# Patient Record
Sex: FEMALE | Race: WHITE | NOT HISPANIC OR LATINO | Employment: FULL TIME
[De-identification: names, ages, dates, MRNs, and addresses within clinical notes are randomized per-mention and may not be internally consistent; named-entity substitution may affect disease eponyms.]

---

## 2017-06-17 ENCOUNTER — HEALTH MAINTENANCE LETTER (OUTPATIENT)
Age: 37
End: 2017-06-17

## 2018-10-16 ENCOUNTER — OFFICE VISIT (OUTPATIENT)
Dept: FAMILY MEDICINE | Facility: CLINIC | Age: 38
End: 2018-10-16
Payer: COMMERCIAL

## 2018-10-16 DIAGNOSIS — Z23 NEED FOR PROPHYLACTIC VACCINATION AND INOCULATION AGAINST INFLUENZA: Primary | ICD-10-CM

## 2018-10-16 PROCEDURE — 90686 IIV4 VACC NO PRSV 0.5 ML IM: CPT

## 2018-10-16 PROCEDURE — 99207 ZZC NO CHARGE NURSE ONLY: CPT

## 2018-10-16 PROCEDURE — 90471 IMMUNIZATION ADMIN: CPT

## 2018-10-16 NOTE — PROGRESS NOTES

## 2018-10-16 NOTE — MR AVS SNAPSHOT
"              After Visit Summary   10/16/2018    Mary Ramirez    MRN: [id number]           Patient Information     Date Of Birth          1980        Visit Information        Provider Department      10/16/2018 4:00 PM [facility]        Today's Diagnoses     Need for prophylactic vaccination and inoculation against influenza    -  1       Follow-ups after your visit        Who to contact     Normal or non-critical lab and imaging results will be communicated to you by MyChart, letter or phone within 4 business days after the clinic has received the results. If you do not hear from us within 7 days, please contact the clinic through MyChart or phone. If you have a critical or abnormal lab result, we will notify you by phone as soon as possible.  Submit refill requests through [employer] or call your pharmacy and they will forward the refill request to us. Please allow 3 business days for your refill to be completed.          If you need to speak with a  for additional information , please call: [phone]           Additional Information About Your Visit        [employer] Information     [employer]iam[employer] lets you send messages to your doctor, view your test results, renew your prescriptions, schedule appointments and more. To sign up, go to [email or web address] . Click on \"Log in\" on the left side of the screen, which will take you to the Welcome page. Then click on \"Sign up Now\" on the right side of the page.     You will be asked to enter the access code listed below, as well as some personal information. Please follow the directions to create your username and password.     Your access code is: [email or web address]  Expires: 2019  4:33 PM     Your access code will  in 90 days. If you need help or a new code, please call your [facility] or [phone].        Care EveryWhere ID     This is your Care EveryWhere ID. This could be used by other organizations to " access your [facility] medical records  [id number]         Blood Pressure from Last 3 Encounters:   09/02/09 120/66   10/04/07 110/72   09/13/07 121/84    Weight from Last 3 Encounters:   09/02/09 150 lb (68 kg)   10/04/07 179 lb (81.2 kg)   09/13/07 178 lb (80.7 kg)              We Performed the Following     FLU VACCINE, SPLIT VIRUS, IM (QUADRIVALENT) [42972]- >3 YRS     Vaccine Administration, Initial [13328]        Primary Care Provider Office Phone # Fax #    Marlen Mccormick [phone] [facility] [address]        Equal Access to Services     DOMINICK RODRIGUES : Hadii devon mustafao Venancio, waaxda lukaiden, qaybta kaalmada wagner, jens díaz. So [facility] [phone].    ATENCIÓN: Si habla español, tiene a montesinos disposición servicios gratuitos de asistencia lingüística. [facility] [phone].    We comply with applicable federal civil rights laws and [place] laws. We do not discriminate on the basis of race, color, national origin, age, disability, sex, sexual orientation, or gender identity.            Thank you!     Thank you for choosing [facility]  for your care. Our goal is always to provide you with excellent care. Hearing back from our patients is one way we can continue to improve our services. Please take a few minutes to complete the written survey that you may receive in the mail after your visit with us. Thank you!             Your Updated Medication List - Protect others around you: Learn how to safely use, store and throw away your medicines at www.disposemymeds.org.          This list is accurate as of 10/16/18  4:33 PM.  Always use your most recent med list.                   Brand Name Dispense Instructions for use Diagnosis    ADVIL 200 MG capsule   Generic drug:  ibuprofen      1 CAPSULE EVERY 4 TO 6 HOURS AS NEEDED

## 2019-11-08 ENCOUNTER — IMMUNIZATION (OUTPATIENT)
Dept: FAMILY MEDICINE | Facility: CLINIC | Age: 39
End: 2019-11-08
Payer: COMMERCIAL

## 2019-11-08 PROCEDURE — 90471 IMMUNIZATION ADMIN: CPT

## 2019-11-08 PROCEDURE — 90686 IIV4 VACC NO PRSV 0.5 ML IM: CPT

## 2020-02-20 ENCOUNTER — OFFICE VISIT (OUTPATIENT)
Dept: FAMILY MEDICINE | Facility: CLINIC | Age: 40
End: 2020-02-20
Payer: COMMERCIAL

## 2020-02-20 ENCOUNTER — ANCILLARY PROCEDURE (OUTPATIENT)
Dept: GENERAL RADIOLOGY | Facility: CLINIC | Age: 40
End: 2020-02-20
Attending: PHYSICIAN ASSISTANT
Payer: COMMERCIAL

## 2020-02-20 VITALS
SYSTOLIC BLOOD PRESSURE: 148 MMHG | TEMPERATURE: 97.9 F | RESPIRATION RATE: 16 BRPM | DIASTOLIC BLOOD PRESSURE: 94 MMHG | HEART RATE: 91 BPM | OXYGEN SATURATION: 98 % | WEIGHT: 205 LBS

## 2020-02-20 DIAGNOSIS — J06.9 VIRAL URI: ICD-10-CM

## 2020-02-20 DIAGNOSIS — R05.9 COUGH: Primary | ICD-10-CM

## 2020-02-20 DIAGNOSIS — R05.9 COUGH: ICD-10-CM

## 2020-02-20 PROCEDURE — 99203 OFFICE O/P NEW LOW 30 MIN: CPT | Performed by: PHYSICIAN ASSISTANT

## 2020-02-20 PROCEDURE — 71046 X-RAY EXAM CHEST 2 VIEWS: CPT | Mod: FY

## 2020-02-20 RX ORDER — AZITHROMYCIN 250 MG/1
TABLET, FILM COATED ORAL
COMMUNITY
Start: 2020-02-19 | End: 2020-06-25

## 2020-02-20 RX ORDER — BENZONATATE 100 MG/1
CAPSULE ORAL
COMMUNITY
Start: 2020-02-19 | End: 2020-06-25

## 2020-02-20 RX ORDER — ALBUTEROL SULFATE 90 UG/1
AEROSOL, METERED RESPIRATORY (INHALATION)
COMMUNITY
Start: 2020-02-19 | End: 2022-08-04

## 2020-02-20 ASSESSMENT — ENCOUNTER SYMPTOMS
NERVOUS/ANXIOUS: 0
NAUSEA: 0
FEVER: 0
VOMITING: 0
ABDOMINAL PAIN: 0
LIGHT-HEADEDNESS: 0
COUGH: 1
CHILLS: 0
SORE THROAT: 1
SHORTNESS OF BREATH: 1
DIARRHEA: 0

## 2020-02-20 NOTE — PATIENT INSTRUCTIONS
Your chest x-ray does not show signs of pneumonia.  Your symptoms are likely viral in nature.  There are no antibiotics to treat viruses.  If your symptoms worsen or you start experiencing fevers, then we would recommend starting the antibiotic you were prescribed.  Additionally, return to clinic if symptoms do worsen.  Otherwise it may take 2 to 4 weeks for your symptoms to slowly improve.  You may use Robitussin-DM for cough and congestion.  You may also benefit from a nasal spray, Flonase, which is a nasal steroid to help reduce congestion.  For pain and fever you may use ibuprofen.    Patient Education     Viral Upper Respiratory Illness (Adult)    You have a viral upper respiratory illness (URI), which is another term for the common cold. This illness is contagious during the first few days. It is spread through the air by coughing and sneezing. It may also be spread by direct contact (touching the sick person and then touching your own eyes, nose, or mouth). Frequent handwashing will decrease risk of spread. Most viral illnesses go away within 7 to 10 days with rest and simple home remedies. Sometimes the illness may last for several weeks. Antibiotics will not kill a virus, and they are generally not prescribed for this condition.  Home care    If symptoms are severe, rest at home for the first 2 to 3 days. When you resume activity, don't let yourself get too tired.    Don't smoke. If you need help stopping, talk with your healthcare provider.    Avoid being exposed to cigarette smoke (yours or others ).    You may use acetaminophen or ibuprofen to control pain and fever, unless another medicine was prescribed. If you have chronic liver or kidney disease, have ever had a stomach ulcer or gastrointestinal bleeding, or are taking blood-thinning medicines, talk with your healthcare provider before using these medicines. Aspirin should never be given to anyone under 18 years of age who is ill with a viral  infection or fever. It may cause severe liver or brain damage.    Your appetite may be poor, so a light diet is fine. Stay well hydrated by drinking 6 to 8 glasses of fluids per day (water, soft drinks, juices, tea, or soup). Extra fluids will help loosen secretions in the nose and lungs.    Over-the-counter cold medicines will not shorten the length of time you re sick, but they may be helpful for the following symptoms: cough, sore throat, and nasal and sinus congestion. If you take prescription medicines, ask your healthcare provider or pharmacist which over-the-counter medicines are safe to use. (Note: Don't use decongestants if you have high blood pressure.)  Follow-up care  Follow up with your healthcare provider, or as advised.  When to seek medical advice  Call your healthcare provider right away if any of these occur:    Cough with lots of colored sputum (mucus)    Severe headache; face, neck, or ear pain    Difficulty swallowing due to throat pain    Fever of 100.4 F (38 C) or higher, or as directed by your healthcare provider  Call 911  Call 911 if any of these occur:    Chest pain, shortness of breath, wheezing, or difficulty breathing    Coughing up blood    Very severe pain with swallowing, especially if it goes along with a muffled voice   Date Last Reviewed: 6/1/2018 2000-2019 [employer]. [address] [place] [address], [place] [address]. All rights reserved. This information is not intended as a substitute for professional medical care. Always follow your healthcare professional's instructions.

## 2020-02-20 NOTE — LETTER
February 20, 2020      Mary Ramirez  [address] [place] [id number]        To Whom It May Concern:    Mary Ramirez was seen in our clinic. She may return to work without restrictions 2/21/20.      Sincerely,        Anh Tobias PA-C

## 2020-02-20 NOTE — NURSING NOTE
"Initial BP (!) 148/94 (BP Location: Right arm, Cuff Size: Adult Large)   Pulse 91   Temp 97.9  F (36.6  C) (Tympanic)   Resp 16   Wt 93 kg (205 lb)   SpO2 98%  Estimated body mass index is 22.48 kg/m  as calculated from the following:    Height as of 9/2/09: 1.74 m (5' 8.5\").    Weight as of 9/2/09: 68 kg (150 lb). .    "

## 2020-02-20 NOTE — PROGRESS NOTES
Subjective     Mary Ramirez is a 39 year old female who presents to clinic today for the following health issues:    Patient has had URI symptoms ongoing for 6 days including intermittently productive cough, fatigue, sore throat, wheezing, chest pain with cough, and dyspnea.   She denies fever, nasal congestion, ear pain, hemoptysis, and rash. Symptoms treated with Tessalon and Inhaler. She denies history of asthma. She was prescribed Azithromycin, but has not been taking it as she is not sure if she has pneumonia. Patient notes daughter has similar symptoms.   Patient denies heart problems, edema, and history of blood clots.       HPI   ENT Symptoms             Symptoms: cc Present Absent Comment   Fever/Chills   x    Fatigue  x  Fatigue x4 days   Muscle Aches   x    Eye Irritation   x    Sneezing   x    Nasal Kobi/Drg   x    Sinus Pressure/Pain   x    Loss of smell   x    Dental pain   x    Sore Throat  x  Sore throat from coughing   Swollen Glands   x    Ear Pain/Fullness   x    Cough x   Cough x4 days, patient was [facility] for cough    Wheeze  x     Chest Pain  x     Shortness of breath  x     Rash   x    Other   x      Symptom duration:  4 days   Symptom severity:  moderate-severe   Treatments tried:  inhaler, benzonatate   Contacts:  daughter has similar symptoms          Patient Active Problem List   Diagnosis     Benign neoplasm of skin of trunk, except scrotum     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     CARDIOVASCULAR SCREENING; LDL GOAL LESS THAN 160     Past Surgical History:   Procedure Laterality Date     APPENDECTOMY       CHOLECYSTECTOMY, LAPOROSCOPIC      Cholecystectomy, Laparoscopic     FRACTURE TX, WRIST RT/LT  2003    Left wrist, closed reduction       Social History     Tobacco Use     Smoking status: Former Smoker     Packs/day: 0.10     Years: 10.00     Pack years: 1.00     Types: Cigarettes     Last attempt to quit: 2003     Years since quittin.6      Smokeless tobacco: Never Used   Substance Use Topics     Alcohol use: No     Comment: rare     Family History   Problem Relation Age of Onset     C.A.D. Maternal Grandfather         CABG-in his late 50's     Diabetes Maternal Grandfather      Thyroid Disease Mother         hypo     Breast Cancer Maternal Grandmother      Family History Negative Paternal Grandfather         was killed in MVA     Diabetes Paternal Aunt      C.A.D. Maternal Uncle         MI at age 43     Hypertension No family hx of      Cerebrovascular Disease No family hx of      Cancer - colorectal No family hx of      Alcohol/Drug No family hx of          Current Outpatient Medications   Medication Sig Dispense Refill     benzonatate 100 MG PO capsule        VENTOLIN  (90 Base) MCG/ACT IN inhaler        azithromycin 250 MG PO tablet        Allergies   Allergen Reactions     Codeine Nausea       Reviewed and updated as needed this visit by Provider         Review of Systems   Constitutional: Negative for chills and fever.   HENT: Positive for sore throat. Negative for congestion and ear pain.    Respiratory: Positive for cough and shortness of breath.    Cardiovascular: Negative for chest pain.   Gastrointestinal: Negative for abdominal pain, diarrhea, nausea and vomiting.   Skin: Negative for rash.   Neurological: Negative for light-headedness.   Psychiatric/Behavioral: The patient is not nervous/anxious.             Objective    BP (!) 148/94 (BP Location: Right arm, Cuff Size: Adult Large)   Pulse 91   Temp 97.9  F (36.6  C) (Tympanic)   Resp 16   Wt 93 kg (205 lb)   SpO2 98%   There is no height or weight on file to calculate BMI.  Physical Exam  Vitals signs and nursing note reviewed.   Constitutional:       General: She is not in acute distress.     Appearance: Normal appearance. She is not ill-appearing or toxic-appearing.   HENT:      Head: Normocephalic and atraumatic.      Right Ear: Tympanic membrane and ear canal normal.       Left Ear: Tympanic membrane and ear canal normal.      Nose: Congestion and rhinorrhea present.      Mouth/Throat:      Mouth: Mucous membranes are moist.      Pharynx: Oropharynx is clear. Posterior oropharyngeal erythema present. No oropharyngeal exudate.      Comments: No swelling   Eyes:      Extraocular Movements: Extraocular movements intact.      Pupils: Pupils are equal, round, and reactive to light.   Neck:      Musculoskeletal: Normal range of motion.   Cardiovascular:      Rate and Rhythm: Normal rate and regular rhythm.      Heart sounds: Normal heart sounds.   Pulmonary:      Effort: Pulmonary effort is normal. No respiratory distress.      Breath sounds: Normal breath sounds. No wheezing, rhonchi or rales.   Musculoskeletal: Normal range of motion.   Skin:     General: Skin is warm and dry.   Neurological:      General: No focal deficit present.      Mental Status: She is alert.   Psychiatric:         Mood and Affect: Mood normal.         Behavior: Behavior normal.            Diagnostic Test Results:  Labs reviewed in [facility]  CXR -Per my interpretation, no signs of consolidation.        Assessment & Plan     1. Cough  Chest x-ray without consolidations to suggest pneumonia.  Low suspicion for influenza given normal vital signs.  Low suspicion for acute pulmonary process as lungs are clear to auscultation and vital signs normal.  Symptoms likely due to viral URI.   - XR Chest 2 Views; Future    2. Viral URI  Discussed symptomatic treatment and OTC options.  Discussed return precautions.         See Patient Instructions    Return in about 2 weeks (around 3/5/2020), or if symptoms worsen or fail to improve.    Anh Tobias PA-C  [facility]

## 2020-06-25 ENCOUNTER — NURSE TRIAGE (OUTPATIENT)
Dept: NURSING | Facility: CLINIC | Age: 40
End: 2020-06-25

## 2020-06-25 ENCOUNTER — VIRTUAL VISIT (OUTPATIENT)
Dept: FAMILY MEDICINE | Facility: CLINIC | Age: 40
End: 2020-06-25
Payer: COMMERCIAL

## 2020-06-25 DIAGNOSIS — Z20.822 EXPOSURE TO COVID-19 VIRUS: Primary | ICD-10-CM

## 2020-06-25 DIAGNOSIS — F41.9 ANXIETY: ICD-10-CM

## 2020-06-25 PROCEDURE — 99214 OFFICE O/P EST MOD 30 MIN: CPT | Mod: 95 | Performed by: FAMILY MEDICINE

## 2020-06-25 RX ORDER — NORGESTIMATE AND ETHINYL ESTRADIOL 0.25-0.035
1 KIT ORAL DAILY
COMMUNITY
End: 2022-08-04

## 2020-06-25 RX ORDER — ESCITALOPRAM OXALATE 10 MG/1
10 TABLET ORAL DAILY
Qty: 30 TABLET | Refills: 3 | Status: SHIPPED | OUTPATIENT
Start: 2020-06-25 | End: 2020-10-19

## 2020-06-25 ASSESSMENT — PATIENT HEALTH QUESTIONNAIRE - PHQ9
5. POOR APPETITE OR OVEREATING: NEARLY EVERY DAY
SUM OF ALL RESPONSES TO PHQ QUESTIONS 1-9: 12

## 2020-06-25 ASSESSMENT — ANXIETY QUESTIONNAIRES
7. FEELING AFRAID AS IF SOMETHING AWFUL MIGHT HAPPEN: NEARLY EVERY DAY
IF YOU CHECKED OFF ANY PROBLEMS ON THIS QUESTIONNAIRE, HOW DIFFICULT HAVE THESE PROBLEMS MADE IT FOR YOU TO DO YOUR WORK, TAKE CARE OF THINGS AT HOME, OR GET ALONG WITH OTHER PEOPLE: EXTREMELY DIFFICULT
3. WORRYING TOO MUCH ABOUT DIFFERENT THINGS: NEARLY EVERY DAY
GAD7 TOTAL SCORE: 20
5. BEING SO RESTLESS THAT IT IS HARD TO SIT STILL: NEARLY EVERY DAY
2. NOT BEING ABLE TO STOP OR CONTROL WORRYING: NEARLY EVERY DAY
6. BECOMING EASILY ANNOYED OR IRRITABLE: MORE THAN HALF THE DAYS
1. FEELING NERVOUS, ANXIOUS, OR ON EDGE: NEARLY EVERY DAY

## 2020-06-25 NOTE — TELEPHONE ENCOUNTER
"Patient and daughter were very ill in early February for a week. Patient states she thought she had pneumonia but did not at the time. They weren't sure what she had.     Patient is calling requesting COVID serologic antibody testing.  NOTE: Serologic testing is a blood test for 'antibodies' which are made at 10-14 days after you have had symptoms of COVID or were exposed and had an asymptomatic infection.  This does NOT test you for 'active' infection or tell you if you are contagious.    Are you a healthcare worker?  No  Do you currently have a cough, fever, body aches, shortness of breath, or difficulty breathing?  Yes.  Follow applicable RN triage protocol.     Transferred patient to scheduling. Call was disconnected and scheduling will call patient directly.     ERIC Box RN      Reason for Disposition    MILD difficulty breathing (e.g., minimal/no SOB at rest, SOB with walking, pulse <100)    Answer Assessment - Initial Assessment Questions  1. COVID-19 DIAGNOSIS: \"Who made your Coronavirus (COVID-19) diagnosis?\" \"Was it confirmed by a positive lab test?\" If not diagnosed by a HCP, ask \"Are there lots of cases (community spread) where you live?\" (See public health department website, if unsure)        2. ONSET: \"When did the COVID-19 symptoms start?\"       Early February  3. WORST SYMPTOM: \"What is your worst symptom?\" (e.g., cough, fever, shortness of breath, muscle aches)      cough  4. COUGH: \"Do you have a cough?\" If so, ask: \"How bad is the cough?\"        Dry mostly, productive in am's  5. FEVER: \"Do you have a fever?\" If so, ask: \"What is your temperature, how was it measured, and when did it start?\"      no  6. RESPIRATORY STATUS: \"Describe your breathing?\" (e.g., shortness of breath, wheezing, unable to speak)       Shortness of breath at rest at times occasionally, sometimes with stairs  7. BETTER-SAME-WORSE: \"Are you getting better, staying the same or getting worse compared to yesterday?\"  If " "getting worse, ask, \"In what way?\"      same  8. HIGH RISK DISEASE: \"Do you have any chronic medical problems?\" (e.g., asthma, heart or lung disease, weak immune system, etc.)      no  9. PREGNANCY: \"Is there any chance you are pregnant?\" \"When was your last menstrual period?\"      no  10. OTHER SYMPTOMS: \"Do you have any other symptoms?\"  (e.g., chills, fatigue, headache, loss of smell or taste, muscle pain, sore throat)        Headaches daily behind right eye, sore throat with coughing, fatigue    Answer Assessment - Initial Assessment Questions  1. CONCERN: \"What happened that made you call today?\"      Cough for 2 months  2. DEPRESSION SYMPTOM SCREENING: \"How are you feeling overall?\" (e.g., decreased energy, increased sleeping or difficulty sleeping, difficulty concentrating, feelings of sadness, guilt, hopelessness, or worthlessness)      Decreased energy, sadness  3. RISK OF HARM - SUICIDAL IDEATION:  \"Do you ever have thoughts of hurting or killing yourself?\"  (e.g., yes, no, no but preoccupation with thoughts about death)    - INTENT:  \"Do you have thoughts of hurting or killing yourself right NOW?\" (e.g., yes, no, N/A)    - PLAN: \"Do you have a specific plan for how you would do this?\" (e.g., gun, knife, overdose, no plan, N/A)      no  4. RISK OF HARM - HOMICIDAL IDEATION:  \"Do you ever have thoughts of hurting or killing someone else?\"  (e.g., yes, no, no but preoccupation with thoughts about death)    - INTENT:  \"Do you have thoughts of hurting or killing someone right NOW?\" (e.g., yes, no, N/A)    - PLAN: \"Do you have a specific plan for how you would do this?\" (e.g., gun, knife, no plan, N/A)       no  5. FUNCTIONAL IMPAIRMENT: \"How have things been going for you overall in your life? Have you had any more difficulties than usual doing your normal daily activities?\"  (e.g., better, same, worse; self-care, school, work, interactions)      no  6. SUPPORT: \"Who is with you now?\" \"Who do you live with?\" " "\"Do you have family or friends nearby who you can talk to?\"       Family and daughter   7. THERAPIST: \"Do you have a counselor or therapist? Name?\"      yes  8. STRESSORS: \"Has there been any new stress or recent changes in your life?\"      Illness   9. DRUG ABUSE/ALCOHOL: \"Do you drink alcohol or use any illegal drugs?\"       Occasionally of both marijuana and alcohol  10. OTHER: \"Do you have any other health or medical symptoms right now?\" (e.g., fever)        cough  11. PREGNANCY: \"Is there any chance you are pregnant?\" \"When was your last menstrual period?\"        no    Protocols used: CORONAVIRUS (COVID-19) DIAGNOSED OR [id number] 5.16.20, DEPRESSION-A-OH    COVID 19 Nurse Triage Plan/Patient Instructions    Please be aware that novel coronavirus (COVID-19) may be circulating in the community. If you develop symptoms such as fever, cough, or SOB or if you have concerns about the presence of another infection including coronavirus (COVID-19), please contact your health care provider or visit [email or web address].     Disposition/Instructions    Patient to schedule a Virtual Visit with provider. Reference Visit Selection Guide.    Thank you for taking steps to prevent the spread of this virus.  o Limit your contact with others.  o Wear a simple mask to cover your cough.  o Wash your hands well and often.    Resources    [facility]: About COVID-19: [email or web address]    CDC: What to Do If You're Sick: www.cdc.gov/coronavirus/2019-ncov/about/steps-when-sick.html    CDC: Ending Home Isolation: www.cdc.gov/coronavirus/2019-ncov/hcp/disposition-in-home-patients.html     CDC: Caring for Someone: www.cdc.gov/coronavirus/2019-ncov/if-you-are-sick/care-for-someone.html     [facility]: Interim Guidance for Hospital Discharge to Home: www.health.[facility].[place]/diseases/coronavirus/hcp/hospdischarge.pdf    [facility] clinical trials (COVID-19 research studies): " clinicalaffairs.[facility]-clinical-trials     Below are the COVID-19 hotlines at the [place] Department of Health ([facility]). Interpreters are available.   o For health questions: Call 530-262-0238 or 1-316.338.2617 (7 a.m. to 7 p.m.)  o For questions about schools and childcare: Call 550-889-9257 or 1-676.397.3622 (7 a.m. to 7 p.m.)

## 2020-06-25 NOTE — PROGRESS NOTES
"Mary Ramirez is a 39 year old female who is being evaluated via a billable telephone visit.        Patient is a 39 yr old female here for anxiety . She has had this for a long period of time. She reports that she used to be on lorazepam but does not want to take this because of fear of dependence. She also reports being on antidepressants in the past . She was tearful on the phone. I asked if she had family around which she confirmed.       The patient has been notified of following:     \"This telephone visit will be conducted via a call between you and your physician/provider. We have found that certain health care needs can be provided without the need for a physical exam.  This service lets us provide the care you need with a short phone conversation.  If a prescription is necessary we can send it directly to your pharmacy.  If lab work is needed we can place an order for that and you can then stop by our lab to have the test done at a later time.    Telephone visits are billed at different rates depending on your insurance coverage. During this emergency period, for some insurers they may be billed the same as an in-person visit.  Please reach out to your insurance provider with any questions.    If during the course of the call the physician/provider feels a telephone visit is not appropriate, you will not be charged for this service.\"    Patient has given verbal consent for Telephone visit?  Yes    What phone number would you like to be contacted at? [phone]    How would you like to obtain your AVS? Mail a copy    Subjective     Mary Ramirez is a 39 year old female who presents via phone visit today for the following health issues:    HPI   Chief Complaint   Patient presents with     Patient would like the blood test for covid 19     Anxiety     Patient had all the sx of COVID 19  in the the winter and would like the serology test for it to see if she did arcually have it   Abnormal Mood " Symptoms  Onset: patient ha shad for a while     Description: patient is struggling with anxiety with the increase in stress   Depression: YES but patient thinks it is ADHD  Anxiety: YES    Accompanying Signs & Symptoms:  Still participating in activities that you used to enjoy: YES  Fatigue: YES  Irritability: no  Difficulty concentrating: YES  Changes in appetite: YES  Problems with sleep: YES- from stress  Heart racing/beating fast : no   Thoughts of hurting yourself or others: none    History:   Recent stress: YES- work  Prior depression hospitalization: None  Family history of depression: no   Family history of anxiety: no     Precipitating factors:   Alcohol/drug use: YES- beer and marijuana    Alleviating factors:Patient likes to Dance     Therapies Tried and outcome: Ativan (Lorezepam)          Patient Active Problem List   Diagnosis     Benign neoplasm of skin of trunk, except scrotum     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     CARDIOVASCULAR SCREENING; LDL GOAL LESS THAN 160     Past Surgical History:   Procedure Laterality Date     APPENDECTOMY       CHOLECYSTECTOMY, LAPOROSCOPIC      Cholecystectomy, Laparoscopic     FRACTURE TX, WRIST RT/LT  2003    Left wrist, closed reduction       Social History     Tobacco Use     Smoking status: Former Smoker     Packs/day: 0.10     Years: 10.00     Pack years: 1.00     Types: Cigarettes     Last attempt to quit: 2003     Years since quittin.0     Smokeless tobacco: Never Used   Substance Use Topics     Alcohol use: No     Comment: rare     Family History   Problem Relation Age of Onset     C.A.D. Maternal Grandfather         CABG-in his late 50's     Diabetes Maternal Grandfather      Thyroid Disease Mother         hypo     Breast Cancer Maternal Grandmother      Family History Negative Paternal Grandfather         was killed in MVA     Diabetes Paternal Aunt      C.A.D. Maternal Uncle         MI at age 43     Hypertension No family hx of       Cerebrovascular Disease No family hx of      Cancer - colorectal No family hx of      Alcohol/Drug No family hx of          Current Outpatient Medications   Medication Sig Dispense Refill     escitalopram (LEXAPRO) 10 MG tablet Take 1 tablet (10 mg) by mouth daily 30 tablet 3     norgestimate-ethinyl estradiol (ORTHO-CYCLEN, 28,) 0.25-35 MG-MCG tablet Take 1 tablet by mouth daily       VENTOLIN  (90 Base) MCG/ACT IN inhaler        Allergies   Allergen Reactions     Codeine Nausea     BP Readings from Last 3 Encounters:   02/20/20 (!) 148/94   09/02/09 120/66   10/04/07 110/72    Wt Readings from Last 3 Encounters:   02/20/20 93 kg (205 lb)   09/02/09 68 kg (150 lb)   10/04/07 81.2 kg (179 lb)                    Reviewed and updated as needed this visit by Provider  Tobacco  Allergies  Meds  Problems  Med Hx  Surg Hx  Fam Hx         Review of Systems   Constitutional, HEENT, cardiovascular, pulmonary, gi and gu systems are negative, except as otherwise noted.       Objective   Reported vitals:  There were no vitals taken for this visit.   healthy, alert and no distress  PSYCH: Alert and oriented times 3; coherent speech, normal   rate and volume, able to articulate logical thoughts, able   to abstract reason, no tangential thoughts, no hallucinations   or delusions  Her affect is anxious and tearful  RESP: No cough, no audible wheezing, able to talk in full sentences  Remainder of exam unable to be completed due to telephone visits    Diagnostic Test Results:  none         Assessment/Plan:  1. Exposure to COVID-19 virus  Patient will be notified of results  - COVID-19 Virus (Coronavirus) Antibody & Titer Reflex; Future    2. Anxiety  Medication faxed, referral placed for counseling.  - escitalopram (LEXAPRO) 10 MG tablet; Take 1 tablet (10 mg) by mouth daily  Dispense: 30 tablet; Refill: 3  - MENTAL HEALTH REFERRAL  - Adult; Outpatient Treatment; Individual/Couples/Family/Group Therapy/Health  Psychology; [facility] [place] [facility] [phone]; We will contact you to schedule the appointment or please call with any questions    Return in about 3 weeks (around 7/16/2020) for Phone Visit.      Phone call duration:  10 minutes    Desirae Lazaro MD

## 2020-06-26 ASSESSMENT — ANXIETY QUESTIONNAIRES: GAD7 TOTAL SCORE: 20

## 2020-10-04 ENCOUNTER — NURSE TRIAGE (OUTPATIENT)
Dept: NURSING | Facility: CLINIC | Age: 40
End: 2020-10-04

## 2020-10-04 ENCOUNTER — HOSPITAL ENCOUNTER (EMERGENCY)
Facility: CLINIC | Age: 40
Discharge: HOME | End: 2020-10-05
Attending: EMERGENCY MEDICINE | Admitting: EMERGENCY MEDICINE
Payer: COMMERCIAL

## 2020-10-04 ENCOUNTER — APPOINTMENT (OUTPATIENT)
Dept: GENERAL RADIOLOGY | Facility: CLINIC | Age: 40
End: 2020-10-04
Attending: EMERGENCY MEDICINE
Payer: COMMERCIAL

## 2020-10-04 VITALS
RESPIRATION RATE: 18 BRPM | HEART RATE: 110 BPM | SYSTOLIC BLOOD PRESSURE: 134 MMHG | OXYGEN SATURATION: 95 % | WEIGHT: 197 LBS | DIASTOLIC BLOOD PRESSURE: 83 MMHG | TEMPERATURE: 98.6 F | BODY MASS INDEX: 29.18 KG/M2 | HEIGHT: 69 IN
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
## 2020-10-04 DIAGNOSIS — S49.91XA SHOULDER INJURY, RIGHT, INITIAL ENCOUNTER: ICD-10-CM

## 2020-10-04 PROCEDURE — 73030 X-RAY EXAM OF SHOULDER: CPT | Mod: RT

## 2020-10-04 PROCEDURE — 99283 EMERGENCY DEPT VISIT LOW MDM: CPT | Performed by: EMERGENCY MEDICINE

## 2020-10-04 PROCEDURE — 99284 EMERGENCY DEPT VISIT MOD MDM: CPT | Performed by: EMERGENCY MEDICINE

## 2020-10-04 RX ORDER — IBUPROFEN 400 MG/1
800 TABLET, FILM COATED ORAL ONCE
Status: DISCONTINUED | OUTPATIENT
Start: 2020-10-04 | End: 2020-10-04 | Stop reason: CLARIF

## 2020-10-04 RX ORDER — ACETAMINOPHEN 325 MG/1
650 TABLET ORAL
Status: COMPLETED | OUTPATIENT
Start: 2020-10-04 | End: 2020-10-05

## 2020-10-04 ASSESSMENT — ENCOUNTER SYMPTOMS
RESPIRATORY NEGATIVE: 1
CARDIOVASCULAR NEGATIVE: 1
PSYCHIATRIC NEGATIVE: 1
NEUROLOGICAL NEGATIVE: 1
EYES NEGATIVE: 1
CONSTITUTIONAL NEGATIVE: 1
GASTROINTESTINAL NEGATIVE: 1
HEMATOLOGIC/LYMPHATIC NEGATIVE: 1
ALLERGIC/IMMUNOLOGIC NEGATIVE: 1
ENDOCRINE NEGATIVE: 1

## 2020-10-04 ASSESSMENT — MIFFLIN-ST. JEOR
SCORE: 1637.97
SCORE: 1637.97

## 2020-10-04 NOTE — ED AVS SNAPSHOT
[facility] Emergency Dept  [address] [address]  Phone: [phone]  Fax: [phone]                                    Mary Ramirez   MRN: [id number]    Department: [facility] Emergency Dept   Date of Visit: 10/4/2020           After Visit Summary Signature Page    I have received my discharge instructions, and my questions have been answered. I have discussed any challenges I see with this plan with the nurse or doctor.    ..........................................................................................................................................  Patient/Patient Representative Signature      ..........................................................................................................................................  Patient Representative Print Name and Relationship to Patient    ..................................................               ................................................  Date                                   Time    ..........................................................................................................................................  Reviewed by Signature/Title    ...................................................              ..............................................  Date                                               Time          22EPIC Rev 08/18

## 2020-10-04 NOTE — LETTER
October 5, 2020      To Whom It May Concern:      Mary Ramirez was seen in our Emergency Department today, 10/05/20.  I expect her condition to improve over the next 1 to 2 weeks.  She is excused from work until she is cleared to return to work at full capacity and duty after further consultation and care.  If she decides to return to work she may work light duty.  She is to remain in her sling for comfort.  This work excuse is valid until October 21, 2020        Sincerely,          Tomás Engel MD

## 2020-10-05 ENCOUNTER — OFFICE VISIT (OUTPATIENT)
Dept: ORTHOPEDICS | Facility: CLINIC | Age: 40
End: 2020-10-05
Payer: COMMERCIAL

## 2020-10-05 VITALS
DIASTOLIC BLOOD PRESSURE: 84 MMHG | BODY MASS INDEX: 29.18 KG/M2 | HEIGHT: 69 IN | WEIGHT: 197 LBS | SYSTOLIC BLOOD PRESSURE: 124 MMHG

## 2020-10-05 DIAGNOSIS — S49.91XA SHOULDER INJURY, RIGHT, INITIAL ENCOUNTER: ICD-10-CM

## 2020-10-05 PROCEDURE — 250N000013 HC RX MED GY IP 250 OP 250 PS 637: Performed by: EMERGENCY MEDICINE

## 2020-10-05 PROCEDURE — 99204 OFFICE O/P NEW MOD 45 MIN: CPT | Performed by: PEDIATRICS

## 2020-10-05 RX ORDER — OXYCODONE AND ACETAMINOPHEN 5; 325 MG/1; MG/1
1 TABLET ORAL
Status: COMPLETED | OUTPATIENT
Start: 2020-10-05 | End: 2020-10-05

## 2020-10-05 RX ORDER — OXYCODONE AND ACETAMINOPHEN 5; 325 MG/1; MG/1
1 TABLET ORAL
Qty: 7 TABLET | Refills: 0 | Status: SHIPPED | OUTPATIENT
Start: 2020-10-05 | End: 2022-08-04

## 2020-10-05 RX ADMIN — ACETAMINOPHEN 650 MG: 325 TABLET, FILM COATED ORAL at 01:04

## 2020-10-05 RX ADMIN — OXYCODONE HYDROCHLORIDE AND ACETAMINOPHEN 1 TABLET: 5; 325 TABLET ORAL at 01:04

## 2020-10-05 ASSESSMENT — MIFFLIN-ST. JEOR: SCORE: 1637.97

## 2020-10-05 NOTE — TELEPHONE ENCOUNTER
"\"I think I dislocated my right shoulder\". She fell, tripping on her cat !+1/2 hrs ago.\" I can't move my arm.\" Painful=\"3\" @ rest, unable to move her arm and it hurts more if she tries. She took 2 Ibuprofen for the pain.    Triaged to a disposition of Go to the ED now. She intends to go to the nearest ER=[facility].     Reason for Disposition    Looks like a dislocated joint    Can't move injured shoulder at all    Additional Information    Shoulder injury is main concern    Negative: Serious injury with multiple fractures    Negative: [1] Major bleeding (e.g., actively dripping or spurting) AND [2] can't be stopped    Negative: Bullet wound, stabbed by knife, or other serious penetrating wound    Negative: Sounds like a life-threatening emergency to the triager    Negative: Wound looks infected    Negative: Shoulder pain from overuse (work, exercise, gardening) OR from self-induced lifting injury    Negative: Shoulder pain not from an injury    Negative: Looks like a broken bone (crooked or deformed)    Protocols used: SHOULDER INJURY-A-AH, ARM INJURY-A-AH  COVID 19 Nurse Triage Plan/Patient Instructions    Please be aware that novel coronavirus (COVID-19) may be circulating in the community. If you develop symptoms such as fever, cough, or SOB or if you have concerns about the presence of another infection including coronavirus (COVID-19), please contact your health care provider or visit [email or web address].     Disposition/Instructions    ED Visit recommended. Follow protocol based instructions.     Bring Your Own Device:  Please also bring your smart device(s) (smart phones, tablets, laptops) and their charging cables for your personal use and to communicate with your care team during your visit.    Thank you for taking steps to prevent the spread of this virus.  o Limit your contact with others.  o Wear a simple mask to cover your cough.  o Wash your hands well and often.    Resources    [facility]: About " COVID-19: [email or web address]    CDC: What to Do If You're Sick: www.cdc.gov/coronavirus/2019-ncov/about/steps-when-sick.html    CDC: Ending Home Isolation: www.cdc.gov/coronavirus/2019-ncov/hcp/disposition-in-home-patients.html     CDC: Caring for Someone: www.cdc.gov/coronavirus/2019-ncov/if-you-are-sick/care-for-someone.html     [facility]: Interim Guidance for Hospital Discharge to Home: www.health.[facility].mn.us/diseases/coronavirus/hcp/hospdischarge.pdf    [facility] clinical trials (COVID-19 research studies): clinicalaffairs.[facility]-clinical-trials     Below are the COVID-19 hotlines at the [place] Department of Health ([facility]). Interpreters are available.   o For health questions: Call 017-747-2754 or 1-476.588.7254 (7 a.m. to 7 p.m.)  o For questions about schools and childcare: Call 195-123-5411 or 1-261.959.5814 (7 a.m. to 7 p.m.)

## 2020-10-05 NOTE — ED PROVIDER NOTES
History     Chief Complaint   Patient presents with     Shoulder Pain     fell on stairs hurting scapula rt side     HPI  Mary Ramirez is a 39 year old female who presents for evaluation for right shoulder pain and discomfort after a fall.  On arrival patient reports she fell down 5 steps at home walking down steps and that her cat typically likes to walk between her feet when she walks down the steps.  She reports she lost her footing falling onto her right shoulder.  She did not hit her head.  The fall occurred about 2 hours before she presented by car from home for further evaluation and care.  She reports no neck pain.  She reports limited range of motion about the shoulder with increasing pain although she took some ibuprofen and had minimal relief and presents for further evaluation and care.    Allergies:  Allergies   Allergen Reactions     Codeine Nausea       Problem List:    Patient Active Problem List    Diagnosis Date Noted     CARDIOVASCULAR SCREENING; LDL GOAL LESS THAN 160 10/31/2010     Priority: Medium     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 01/15/2007     Priority: Medium     Problem list name updated by automated process. Provider to review       Benign neoplasm of skin of trunk, except scrotum 03/25/2005     Priority: Medium        Past Medical History:    Past Medical History:   Diagnosis Date     Depressive disorder, not elsewhere classified      Irritable bowel syndrome      Pyelonephritis, unspecified age 14       Past Surgical History:    Past Surgical History:   Procedure Laterality Date     APPENDECTOMY       CHOLECYSTECTOMY, LAPOROSCOPIC      Cholecystectomy, Laparoscopic     FRACTURE TX, WRIST RT/LT  11/2003    Left wrist, closed reduction       Family History:    Family History   Problem Relation Age of Onset     C.A.D. Maternal Grandfather         CABG-in his late 50's     Diabetes Maternal Grandfather      Thyroid Disease Mother         hypo     Breast Cancer Maternal  "Grandmother      Family History Negative Paternal Grandfather         was killed in MVA     Diabetes Paternal Aunt      JOSIAS. Maternal Uncle         MI at age 43     Hypertension No family hx of      Cerebrovascular Disease No family hx of      Cancer - colorectal No family hx of      Alcohol/Drug No family hx of        Social History:  Marital Status:  Single [1]  Social History     Tobacco Use     Smoking status: Former Smoker     Packs/day: 0.10     Years: 10.00     Pack years: 1.00     Types: Cigarettes     Quit date: 2003     Years since quittin.2     Smokeless tobacco: Never Used   Substance Use Topics     Alcohol use: No     Comment: rare     Drug use: No        Medications:         oxyCODONE-acetaminophen (PERCOCET) 5-325 MG tablet       escitalopram (LEXAPRO) 10 MG tablet       norgestimate-ethinyl estradiol (ORTHO-CYCLEN, 28,) 0.25-35 MG-MCG tablet       VENTOLIN  (90 Base) MCG/ACT IN inhaler          Review of Systems   Constitutional: Negative.         Fall down 5 steps   HENT: Negative.    Eyes: Negative.    Respiratory: Negative.    Cardiovascular: Negative.    Gastrointestinal: Negative.    Endocrine: Negative.    Genitourinary: Negative.    Musculoskeletal:        Right shoulder pain   Skin: Negative.    Allergic/Immunologic: Negative.    Neurological: Negative.    Hematological: Negative.    Psychiatric/Behavioral: Negative.    All other systems reviewed and are negative.      Physical Exam   BP: (!) 164/101  Pulse: 115  Temp: 98.6  F (37  C)  Resp: 18  Height: 175.3 cm (5' 9\")  Weight: 89.4 kg (197 lb)  SpO2: 97 %      Physical Exam  Constitutional:       General: She is not in acute distress.     Appearance: Normal appearance. She is not ill-appearing, toxic-appearing or diaphoretic.   HENT:      Head: Normocephalic and atraumatic.      Nose: Nose normal. No congestion.   Eyes:      General: No scleral icterus.        Right eye: No discharge.         Left eye: No discharge.      " Extraocular Movements: Extraocular movements intact.      Pupils: Pupils are equal, round, and reactive to light.   Neck:      Musculoskeletal: Normal range of motion and neck supple. No neck rigidity or muscular tenderness.      Vascular: No carotid bruit.   Cardiovascular:      Rate and Rhythm: Normal rate and regular rhythm.      Pulses: Normal pulses.      Heart sounds: Normal heart sounds.   Pulmonary:      Effort: Pulmonary effort is normal. No respiratory distress.      Breath sounds: Normal breath sounds. No stridor. No wheezing, rhonchi or rales.   Chest:      Chest wall: No tenderness.   Musculoskeletal:         General: Tenderness and signs of injury present. No swelling or deformity.      Right shoulder: She exhibits decreased range of motion, tenderness, bony tenderness and pain. She exhibits no swelling.        Arms:    Lymphadenopathy:      Cervical: No cervical adenopathy.   Skin:     Capillary Refill: Capillary refill takes less than 2 seconds.      Coloration: Skin is not jaundiced or pale.      Findings: No bruising, erythema, lesion or rash.   Neurological:      General: No focal deficit present.      Mental Status: She is alert and oriented to person, place, and time.      Cranial Nerves: No cranial nerve deficit.      Sensory: No sensory deficit.      Motor: No weakness.      Coordination: Coordination normal.      Gait: Gait normal.      Deep Tendon Reflexes: Reflexes normal.   Psychiatric:         Mood and Affect: Mood normal.         Behavior: Behavior normal.         Thought Content: Thought content normal.         Judgment: Judgment normal.         ED Course        Procedures               Critical Care time:  none               ED medications:  Medications   acetaminophen (TYLENOL) tablet 650 mg (650 mg Oral Given 10/5/20 0104)   oxyCODONE-acetaminophen (PERCOCET) 5-325 MG per tablet 1 tablet (1 tablet Oral Given 10/5/20 0104)         ED Vitals:  Vitals:    10/04/20 2304 10/04/20 2313  "10/04/20 2320 10/04/20 2330   BP: (!) 164/101 (!) 151/100 (!) 134/95 134/83   Pulse:  115 113 110   Resp:  18     Temp: 98.6  F (37  C) 98.6  F (37  C)     TempSrc: Oral Oral     SpO2: 97% 98% 96% 95%   Weight: 89.4 kg (197 lb) 89.4 kg (197 lb)     Height: 1.753 m (5' 9\") 1.753 m (5' 9\")         ED labs and imaging:  Results for orders placed or performed during the hospital encounter of 10/04/20   Shoulder XR, 2 view, right     Status: None    Narrative    EXAM: XR SHOULDER 2 VIEW RIGHT  LOCATION: [facility]  DATE/TIME: 10/4/2020 11:47 PM    INDICATION: Fall, pain.  COMPARISON: None.      Impression    IMPRESSION: Normal joint spaces and alignment. No fracture.          Assessments & Plan (with Medical Decision Making)   Assessment Summary and Clinical Impression: 39 year old female right hand dominant who presented by car from home with right shoulder pain and discomfort after a mechanical fall down 5 steps after she lost her bearings while her cat was walking between her legs walking down steps.  Patient suffered a right shoulder injury there was no obvious bony fracture or gross dislocation imaging she is discharged home with supportive care with a sling and follow-up evaluation of persistent pain including consideration for advanced imaging.  Patient reported she developed progressive pain about the shoulder with limitation in range of motion.  She reports she did not hit her head.  She has no neck pain.  She has no tingling about her hand and no elbow pain.  She has no chest wall pain.  On exam she was favoring her right shoulder there was no obvious deformity appreciated however range of motion was limited.  Her arm was well-perfused and sensation was preserved grossly.  Reported improvement during her ED course after she was placed in a sling and expressed comfort with outpatient follow-up    ED course and Plan:  Reviewed the medical record.   She was offered medications for pain and " discomfort. X-ray imaging of her right shoulder was obtained.  X-ray was reviewed independently there was no discrete bony fracture or dislocation appreciated.  The radiologist interpretation was also reviewed.  Patient was reevaluated after x-ray imaging and oral pain medication.  She continued have some intermittent discomfort and range of motion was somewhat limited.  We discussed she could have a subtle subluxation or rotator cuff or ligamentous injury.  She was placed in a sling for comfort and discharged home with Percocet x7 tablets to use as needed in addition to ibuprofen and Tylenol.  We discussed recommended daily Tylenol dosing.  A referral was sent to the sports medicine clinic through the [facility] service for additional advanced imaging consideration if she has progressive or persistent pain with limitation with range of motion after supportive care.  We discussed and reviewed reasons to return to the department to be reevaluated patient expressed comfort, understanding, and agreement of plan of care.      Disclaimer: This note consists of symbols derived from keyboarding, dictation and/or voice recognition software. As a result, there may be errors in the script that have gone undetected. Please consider this when interpreting information found in this chart.  I have reviewed the nursing notes.    I have reviewed the findings, diagnosis, plan and need for follow up with the patient.       New Prescriptions    OXYCODONE-ACETAMINOPHEN (PERCOCET) 5-325 MG TABLET    Take 1 tablet by mouth nightly as needed for pain, moderate to severe pain or severe pain       Final diagnoses:   Shoulder injury, right, initial encounter - tripped down 5 steps at home   Fall, initial encounter - down 5 steps onto right shoulder       10/4/2020   [facility] EMERGENCY DEPT     James Engel MD  10/05/20 0128

## 2020-10-05 NOTE — LETTER
10/5/2020         RE: Mary Ramirez  [address] [facility] [address]        Dear Colleague,    Thank you for referring your patient, Mary Ramirez, to the [facility]. Please see a copy of my visit note below.    Sports Medicine Clinic Visit    PCP: Marlen Mccormick    Mary Ramirez is a 39 year old female who is seen in consultation at the request of  James Engel M.D. presenting with right shoulder pain.    Injury: She reports she tripped down her stairs trying to avoid her cat. She landed on her posterior right shoulder. She is right hand dominate. The injury was on 10/4/20. She was seen in the ED and was placed in a sling.    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    Patient's current problem list, past medical and surgical history, and family history were reviewed.    Patient Active Problem List   Diagnosis     Benign neoplasm of skin of trunk, except scrotum     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     CARDIOVASCULAR SCREENING; LDL GOAL LESS THAN 160     Past Medical History:   Diagnosis Date     Depressive disorder, not elsewhere classified     Took Paxil for a short time     Irritable bowel syndrome       "Pyelonephritis, unspecified age 14     Past Surgical History:   Procedure Laterality Date     APPENDECTOMY       CHOLECYSTECTOMY, LAPOROSCOPIC      Cholecystectomy, Laparoscopic     FRACTURE TX, WRIST RT/LT  11/2003    Left wrist, closed reduction     Family History   Problem Relation Age of Onset     JOEY Maternal Grandfather         CABG-in his late 50's     Diabetes Maternal Grandfather      Thyroid Disease Mother         hypo     Breast Cancer Maternal Grandmother      Family History Negative Paternal Grandfather         was killed in MVA     Diabetes Paternal Aunt      JOSIAS. Maternal Uncle         MI at age 43     Hypertension No family hx of      Cerebrovascular Disease No family hx of      Cancer - colorectal No family hx of      Alcohol/Drug No family hx of          Objective  /84   Ht 1.753 m (5' 9\")   Wt 89.4 kg (197 lb)   BMI 29.09 kg/m        GENERAL APPEARANCE: healthy, alert and no distress   GAIT: NORMAL  SKIN: no suspicious lesions or rashes  HEENT: Sclera clear, anicteric  CV: good peripheral pulses  RESP: Breathing not labored  NEURO: Normal strength and tone, mentation intact and speech normal  PSYCH:  mentation appears normal and affect normal/bright    Bilateral Shoulder exam    Inspection and Posture:       rounded shoulders and upper back    Skin:        no visible deformities    Tender:        Posterior shoulder in scapula, mild proximal humerus    Non Tender:       remainder of shoulder bilateral    ROM:        forward flexion 90  right       abduction 90 right       internal rotation minimal right       external rotation 35 right    Painful motions:       All motions    Strength:        Unable to perform strength testing    Sensation:        normal sensation over shoulder and upper extremity       Radiology  I visualized and reviewed these images with the patient  Shoulder Xr, 2 View, Right  Result Date: 10/5/2020  EXAM: XR SHOULDER 2 VIEW RIGHT LOCATION: [facility]" Services DATE/TIME: 10/4/2020 11:47 PM INDICATION: Fall, pain. COMPARISON: None.   IMPRESSION: Normal joint spaces and alignment. No fracture.   - Some concern on Y view for possible scapula fracture    Assessment:  1. Shoulder injury, right, initial encounter      We discussed these other possible diagnosis: scapula fracture, rotator cuff tear    Plan:  - Today's Plan of Care:  Topical Treatments: Ice  Over the counter medication: Ibuprofen (Advil) maximum of 800mg three times a day with food OR  Naproxen (Aleve) maximum of 440mg two times a day with food  MRI of the Right Shoulder and Scapula - Call [phone] to schedule MRI  Continue Sling as needed, can start gentle pendulum exercises  Continue with relative rest and activity modification, Ice, Compression, and Elevation.  Can apply ice 10-15 minutes 3-4 times per day as needed.    -We also discussed other future treatment options:  Referral to Orthopedic Surgery    Follow Up: In clinic with Dr. Leggett after MRI (wait at least 1-2 days)    Concerning signs and symptoms were reviewed.  The patient expressed understanding of this management plan and all questions were answered at this time.    Jessy Leggett MD CA  Primary Care Sports Medicine  [facility]      Again, thank you for allowing me to participate in the care of your patient.        Sincerely,        Jessy Leggett MD

## 2020-10-05 NOTE — PATIENT INSTRUCTIONS
We discussed these other possible diagnosis: scapula fracture, rotator cuff tear    Plan:  - Today's Plan of Care:  Topical Treatments: Ice  Over the counter medication: Ibuprofen (Advil) maximum of 800mg three times a day with food OR  Naproxen (Aleve) maximum of 440mg two times a day with food  MRI of the Right Shoulder and Scapula - Call [phone] to schedule MRI  Continue Sling as needed, can start gentle pendulum exercises  Continue with relative rest and activity modification, Ice, Compression, and Elevation.  Can apply ice 10-15 minutes 3-4 times per day as needed.    -We also discussed other future treatment options:  Referral to Orthopedic Surgery    Follow Up: In clinic with Dr. Leggett after MRI (wait at least 1-2 days)    If you have any further questions for your physician or physician s care team you can call [phone] and use option 3 to leave a voice message. Calls received during business hours will be returned same day.

## 2020-10-05 NOTE — PROGRESS NOTES
Sports Medicine Clinic Visit    PCP: Marlen Mccormick    Mary Ramirez is a 39 year old female who is seen in consultation at the request of  James Engel M.D. presenting with right shoulder pain.    Injury: She reports she tripped down her stairs trying to avoid her cat. She landed on her posterior right shoulder. She is right hand dominate. The injury was on 10/4/20. She was seen in the ED and was placed in a sling.    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    Patient's current problem list, past medical and surgical history, and family history were reviewed.    Patient Active Problem List   Diagnosis     Benign neoplasm of skin of trunk, except scrotum     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     CARDIOVASCULAR SCREENING; LDL GOAL LESS THAN 160     Past Medical History:   Diagnosis Date     Depressive disorder, not elsewhere classified     Took Paxil for a short time     Irritable bowel syndrome      Pyelonephritis, unspecified age 14     Past Surgical History:   Procedure Laterality Date     APPENDECTOMY       CHOLECYSTECTOMY, LAPOROSCOPIC      Cholecystectomy, Laparoscopic     FRACTURE TX, WRIST RT/LT  11/2003    Left wrist, closed reduction     Family History  "  Problem Relation Age of Onset     JOEY Maternal Grandfather         CABG-in his late 50's     Diabetes Maternal Grandfather      Thyroid Disease Mother         hypo     Breast Cancer Maternal Grandmother      Family History Negative Paternal Grandfather         was killed in MVA     Diabetes Paternal Aunt      JOSIAS. Maternal Uncle         MI at age 43     Hypertension No family hx of      Cerebrovascular Disease No family hx of      Cancer - colorectal No family hx of      Alcohol/Drug No family hx of          Objective  /84   Ht 1.753 m (5' 9\")   Wt 89.4 kg (197 lb)   BMI 29.09 kg/m        GENERAL APPEARANCE: healthy, alert and no distress   GAIT: NORMAL  SKIN: no suspicious lesions or rashes  HEENT: Sclera clear, anicteric  CV: good peripheral pulses  RESP: Breathing not labored  NEURO: Normal strength and tone, mentation intact and speech normal  PSYCH:  mentation appears normal and affect normal/bright    Bilateral Shoulder exam    Inspection and Posture:       rounded shoulders and upper back    Skin:        no visible deformities    Tender:        Posterior shoulder in scapula, mild proximal humerus    Non Tender:       remainder of shoulder bilateral    ROM:        forward flexion 90  right       abduction 90 right       internal rotation minimal right       external rotation 35 right    Painful motions:       All motions    Strength:        Unable to perform strength testing    Sensation:        normal sensation over shoulder and upper extremity       Radiology  I visualized and reviewed these images with the patient  Shoulder Xr, 2 View, Right  Result Date: 10/5/2020  EXAM: XR SHOULDER 2 VIEW RIGHT LOCATION: [facility] DATE/TIME: 10/4/2020 11:47 PM INDICATION: Fall, pain. COMPARISON: None.   IMPRESSION: Normal joint spaces and alignment. No fracture.   - Some concern on Y view for possible scapula fracture    Assessment:  1. Shoulder injury, right, initial encounter      We " discussed these other possible diagnosis: scapula fracture, rotator cuff tear    Plan:  - Today's Plan of Care:  Topical Treatments: Ice  Over the counter medication: Ibuprofen (Advil) maximum of 800mg three times a day with food OR  Naproxen (Aleve) maximum of 440mg two times a day with food  MRI of the Right Shoulder and Scapula - Call [phone] to schedule MRI  Continue Sling as needed, can start gentle pendulum exercises  Continue with relative rest and activity modification, Ice, Compression, and Elevation.  Can apply ice 10-15 minutes 3-4 times per day as needed.    -We also discussed other future treatment options:  Referral to Orthopedic Surgery    Follow Up: In clinic with Dr. Leggett after MRI (wait at least 1-2 days)    Concerning signs and symptoms were reviewed.  The patient expressed understanding of this management plan and all questions were answered at this time.    Jessy Leggett MD CAQ  Primary Care Sports Medicine  [facility]

## 2020-10-05 NOTE — ED TRIAGE NOTES
Pt states that she fell on the stairs tonight hitting the rt shoulder and scapular area. She denies hitting her head or neck. She states that it feels like it is dislocated although she has no Hx of this. She has pain with movement. She ambulated into the ED without any apparent distress.

## 2020-10-05 NOTE — DISCHARGE INSTRUCTIONS
1) Your evaluation today does not suggest a broken bone about your right shoulder and no gross dislocation.  X-ray imaging was reassuring however you continue to have pain and limitation of range of motion about her shoulder after your fall.  We discussed the possibility of ligamentous injury including a rotator cuff tear.    2) we have agreed that you are stable for discharge to home with plan to take pain medication as needed continue to use ibuprofen or Motrin 400 mg every 6 hours, pain medication at night as needed and Tylenol-1000mg three times a day. Consider applying heat to the area of discomfort.    3) a referral was placed to the sports medicine clinic for follow-up in the next few days if you continue to have pain as an MRI may be necessary for further evaluation. You should be called for a follow-up visit in the next 3 to 5 days.    4) With injury involving your dominant hand/ shoulder it may be best to avoid working until you have improvement in range of motion about your shoulder  and your pain is well managed.

## 2020-10-07 ENCOUNTER — TELEPHONE (OUTPATIENT)
Dept: ORTHOPEDICS | Facility: CLINIC | Age: 40
End: 2020-10-07

## 2020-10-07 NOTE — TELEPHONE ENCOUNTER
Called and spoke with patient.  Relayed message regarding restrictions.  She states that she will not need a letter for work at this time.  If her work requires an updated letter she will let us know.    Rayna Manjarrez ATC

## 2020-10-07 NOTE — TELEPHONE ENCOUNTER
Patient LVM stating that her work is wondering if she has restrictions.    She can be reached at  to discuss    Malathi Barnett MS ATC

## 2020-10-07 NOTE — TELEPHONE ENCOUNTER
Called and spoke with patient.    She returned to work yesterday 10/6/20.  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     She states that her arm felt fatigued after work yesterday but she did feel safe completing her typical work duties.    She wants to make sure that she would not be injuring her shoulder further.    She would like letter emailed to [email or web address]     Rayna Manjarrez ATC

## 2020-10-07 NOTE — TELEPHONE ENCOUNTER
We did not discuss work restrictions.  Has patient scheduled MRI yet? I will have more information after that.    Instructions per last visit - continue sling  Would not lift with right arm and use sling.    Jessy Leggett MD

## 2020-10-14 ENCOUNTER — ANCILLARY PROCEDURE (OUTPATIENT)
Dept: MRI IMAGING | Facility: CLINIC | Age: 40
End: 2020-10-14
Attending: PEDIATRICS
Payer: COMMERCIAL

## 2020-10-14 DIAGNOSIS — S49.91XA SHOULDER INJURY, RIGHT, INITIAL ENCOUNTER: ICD-10-CM

## 2020-10-14 PROCEDURE — 73218 MRI UPPER EXTREMITY W/O DYE: CPT | Mod: RT

## 2020-10-14 PROCEDURE — 73221 MRI JOINT UPR EXTREM W/O DYE: CPT | Mod: RT

## 2020-10-16 ENCOUNTER — OFFICE VISIT (OUTPATIENT)
Dept: ORTHOPEDICS | Facility: CLINIC | Age: 40
End: 2020-10-16
Payer: COMMERCIAL

## 2020-10-16 VITALS — HEIGHT: 69 IN | WEIGHT: 197 LBS | BODY MASS INDEX: 29.18 KG/M2

## 2020-10-16 DIAGNOSIS — S46.011D TRAUMATIC INCOMPLETE TEAR OF RIGHT ROTATOR CUFF, SUBSEQUENT ENCOUNTER: ICD-10-CM

## 2020-10-16 DIAGNOSIS — S49.91XD INJURY OF RIGHT SHOULDER, SUBSEQUENT ENCOUNTER: ICD-10-CM

## 2020-10-16 DIAGNOSIS — S42.111A CLOSED DISPLACED FRACTURE OF BODY OF RIGHT SCAPULA, INITIAL ENCOUNTER: Primary | ICD-10-CM

## 2020-10-16 PROCEDURE — 99214 OFFICE O/P EST MOD 30 MIN: CPT | Performed by: PEDIATRICS

## 2020-10-16 ASSESSMENT — MIFFLIN-ST. JEOR: SCORE: 1637.97

## 2020-10-16 NOTE — PATIENT INSTRUCTIONS
Plan:  - Today's Plan of Care:  Referral to an Orthopedic Surgeon  Wean sling, start gentle range of motion exercises    -We also discussed other future treatment options:  Referral to Physical therapy    Follow Up: with orthopedic surgery    If you have any further questions for your physician or physician s care team you can call [phone] and use option 3 to leave a voice message. Calls received during business hours will be returned same day.

## 2020-10-16 NOTE — RESULT ENCOUNTER NOTE
These results were discussed during office visit.    Jessy Leggett MD, CAQ  Primary Care Sports Medicine  [facility]

## 2020-10-16 NOTE — LETTER
October 16, 2020      Mary Ramirez  [address] [place] [id number]        To Whom It May Concern:    Mary Ramirez was seen in our clinic. She may return to work with the following: limited to light duty - lifting no greater than 5 pounds and no use of arms over shoulders.  Follow up will be with orthopedic surgery      Sincerely,        Jessy Leggett MD

## 2020-10-16 NOTE — LETTER
10/16/2020         RE: Mary Ramirez  [address] [facility] [address]        Dear Colleague,    Thank you for referring your patient, Mary Ramirez, to the [facility]. Please see a copy of my visit note below.    Sports Medicine Clinic Visit - Interim History October 16, 2020    PCP: Marlen Mccormick    Mary Ramirez is a 39 year old female who is seen in f/u up for Shoulder injury, right  Since last visit on 10/14/20 patient has completed and MRI of the shoulder and scapula. She reports improvement in her pain. She is able to completed most activities below shoulder level. She has pain with overhead motion and lifting the right arm.    Symptoms are better with: Rest and sling  Symptoms are worse with: overhead motions and lifting  Additional Features:   Positive: popping and weakness   Negative: swelling, bruising, grinding, catching, locking, instability, paresthesias and numbness    Social History: desk work    Review of Systems  Skin: no bruising, no swelling  Musculoskeletal: as above  Neurologic: no numbness, paresthesias  Remainder of review of systems is negative including constitutional, CV, pulmonary, GI, except as noted in HPI or medical history.    Patient's current problem list, past medical and surgical history, and family history were reviewed.    Patient Active Problem List   Diagnosis     Benign neoplasm of skin of trunk, except scrotum     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     CARDIOVASCULAR SCREENING; LDL GOAL LESS THAN 160     Past Medical History:   Diagnosis Date     Depressive disorder, not elsewhere classified     Took Paxil for a short time     Irritable bowel syndrome      Pyelonephritis, unspecified age 14     Past Surgical History:   Procedure Laterality Date     APPENDECTOMY       CHOLECYSTECTOMY, LAPOROSCOPIC      Cholecystectomy, Laparoscopic     FRACTURE TX, WRIST RT/LT  11/2003    Left wrist, closed reduction     Family  "History   Problem Relation Age of Onset     JOEY Maternal Grandfather         CABG-in his late 50's     Diabetes Maternal Grandfather      Thyroid Disease Mother         hypo     Breast Cancer Maternal Grandmother      Family History Negative Paternal Grandfather         was killed in MVA     Diabetes Paternal Aunt      JOEY Maternal Uncle         MI at age 43     Hypertension No family hx of      Cerebrovascular Disease No family hx of      Cancer - colorectal No family hx of      Alcohol/Drug No family hx of        Objective  Ht 1.753 m (5' 9\")   Wt 89.4 kg (197 lb)   BMI 29.09 kg/m      GENERAL APPEARANCE: healthy, alert and no distress   GAIT: NORMAL  SKIN: no suspicious lesions or rashes  HEENT: Sclera clear, anicteric  CV: good peripheral pulses  RESP: Breathing not labored  NEURO: Normal strength and tone, mentation intact and speech normal  PSYCH:  mentation appears normal and affect normal/bright     Bilateral Shoulder exam  Inspection and Posture:       rounded shoulders and upper back     Skin:        no visible deformities     Tender:        Posterior shoulder in scapula, mild proximal humerus     Non Tender:       remainder of shoulder bilateral     ROM:        forward flexion 120  right       abduction 90 right       internal rotation minimal right       external rotation 35 right     Painful motions:       end range flexion and extension     Strength:        5/5 in flexion, internal and external rotation, 4/5 in abduction     Sensation:        normal sensation over shoulder and upper extremity     Radiology  I ordered, visualized and reviewed these images with the patient  EXAM: MR right shoulder without  contrast 10/14/2020 12:44 PM     TECHNIQUE: Multiplanar, multisequence imaging of the right shoulder  were obtained without administration of intravenous or intra-articular  gadolinium contrast using routine protocol.     History: eval RC tear, scapula fracture; Shoulder injury, right,  initial " encounter      Additional Clinical information from EMR: Fall down 5 steps at home.     Comparison: Radiographs of the right shoulder 10/4/2020     Findings:     ROTATOR CUFF and ASSOCIATED STRUCTURES  Rotator cuff: On a background of tendinosis there is a moderate grade  bursal sided tear of the supraspinatus at the footprint with extension  into the anterior junctional fibers of the infraspinatus as a  low-grade bursal sided tear. Subscapularis and teres minor are intact.     Bursa: Small amount of subacromial and subdeltoid bursal fluid.     Musculature: Muscle bulk of rotator cuff is preserved.  Deltoid muscle  bulk is also preserved.  No muscle edema.     Acromioclavicular joint  There are mild degenerative changes of the acromioclavicular joint.  Acromion is type 2 in sagittal morphology.  Coracoacromial ligament is  not thickened.     OSSEOUS STRUCTURES  Mildly displaced fracture of the distal scapular body/tip adjacent to  the inferior angle and violating the lateral border.     LONG BICIPITAL TENDON  The long head of the biceps tendon is normally situated within the  bicipital groove. No complete or partial biceps tendon tear is  present.     GLENOHUMERAL JOINT  Joint fluid: Physiologic amount of joint fluid is  present.     Cartilage and subarticular bone:  No focal hyaline cartilage defects  are noted. No Hill-Sachs, reverse Hill-Sachs, or bony Bankart lesions  are seen.     Labrum: Limited assessment on this study with relative lack of joint  distention shows no labral tear.     ANCILLARY FINDINGS:  Edema within the latissimus dorsi and inferoposterior serratus  anterior adjacent to the scapular fracture. Mild subscapularis edema  inferiorly, also adjacent to the fracture.                                                                      Impression:  1. Mildly displaced fracture of the inferior scapular body/tip with  adjacent edema/hematoma in the inferior subscapularis, inferoposterior  serratus  anterior, and latissimus dorsi.  2. On a background of tendinosis, there is a moderate grade bursal  sided tear of the supraspinatus at the footplate with extension into  the anterior junctional fibers of the infraspinatus as a low-grade  bursal sided tear with minimal retraction.  3. Mild degenerative changes of the acromioclavicular joint.     I have personally reviewed the examination and initial interpretation  and I agree with the findings.      Assessment:  1. Closed displaced fracture of body of right scapula, initial encounter    2. Traumatic incomplete tear of right rotator cuff, subsequent encounter    3. Injury of right shoulder, subsequent encounter      Given partial rotator cuff tear and scapula fracture, will have patient follow up with orthopedic surgery.  Can wean sling, start ROM exercises.    Plan:  - Today's Plan of Care:  Referral to an Orthopedic Surgeon  Wean sling, start gentle range of motion exercises  Work Letter    -We also discussed other future treatment options:  Referral to Physical therapy    Follow Up: with orthopedic surgery    Concerning signs and symptoms were reviewed.  The patient expressed understanding of this management plan and all questions were answered at this time.    Jessy Leggett MD [facility]  Primary Care Sports Medicine  [facility]      Again, thank you for allowing me to participate in the care of your patient.        Sincerely,        Jessy Leggett MD

## 2020-10-16 NOTE — PROGRESS NOTES
Sports Medicine Clinic Visit - Interim History October 16, 2020    PCP: Marlen Mccormick    Mary Ramirez is a 39 year old female who is seen in f/u up for Shoulder injury, right  Since last visit on 10/14/20 patient has completed and MRI of the shoulder and scapula. She reports improvement in her pain. She is able to completed most activities below shoulder level. She has pain with overhead motion and lifting the right arm.    Symptoms are better with: Rest and sling  Symptoms are worse with: overhead motions and lifting  Additional Features:   Positive: popping and weakness   Negative: swelling, bruising, grinding, catching, locking, instability, paresthesias and numbness    Social History: desk work    Review of Systems  Skin: no bruising, no swelling  Musculoskeletal: as above  Neurologic: no numbness, paresthesias  Remainder of review of systems is negative including constitutional, CV, pulmonary, GI, except as noted in HPI or medical history.    Patient's current problem list, past medical and surgical history, and family history were reviewed.    Patient Active Problem List   Diagnosis     Benign neoplasm of skin of trunk, except scrotum     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     CARDIOVASCULAR SCREENING; LDL GOAL LESS THAN 160     Past Medical History:   Diagnosis Date     Depressive disorder, not elsewhere classified     Took Paxil for a short time     Irritable bowel syndrome      Pyelonephritis, unspecified age 14     Past Surgical History:   Procedure Laterality Date     APPENDECTOMY       CHOLECYSTECTOMY, LAPOROSCOPIC      Cholecystectomy, Laparoscopic     FRACTURE TX, WRIST RT/LT  11/2003    Left wrist, closed reduction     Family History   Problem Relation Age of Onset     C.A.D. Maternal Grandfather         CABG-in his late 50's     Diabetes Maternal Grandfather      Thyroid Disease Mother         hypo     Breast Cancer Maternal Grandmother      Family History Negative Paternal Grandfather        "  was killed in MVA     Diabetes Paternal Aunt      GAVINO.[email or web address]. Maternal Uncle         MI at age 43     Hypertension No family hx of      Cerebrovascular Disease No family hx of      Cancer - colorectal No family hx of      Alcohol/Drug No family hx of        Objective  Ht 1.753 m (5' 9\")   Wt 89.4 kg (197 lb)   BMI 29.09 kg/m      GENERAL APPEARANCE: healthy, alert and no distress   GAIT: NORMAL  SKIN: no suspicious lesions or rashes  HEENT: Sclera clear, anicteric  CV: good peripheral pulses  RESP: Breathing not labored  NEURO: Normal strength and tone, mentation intact and speech normal  PSYCH:  mentation appears normal and affect normal/bright     Bilateral Shoulder exam  Inspection and Posture:       rounded shoulders and upper back     Skin:        no visible deformities     Tender:        Posterior shoulder in scapula, mild proximal humerus     Non Tender:       remainder of shoulder bilateral     ROM:        forward flexion 120  right       abduction 90 right       internal rotation minimal right       external rotation 35 right     Painful motions:       end range flexion and extension     Strength:        5/5 in flexion, internal and external rotation, 4/5 in abduction     Sensation:        normal sensation over shoulder and upper extremity     Radiology  I ordered, visualized and reviewed these images with the patient  EXAM: MR right shoulder without  contrast 10/14/2020 12:44 PM     TECHNIQUE: Multiplanar, multisequence imaging of the right shoulder  were obtained without administration of intravenous or intra-articular  gadolinium contrast using routine protocol.     History: eval RC tear, scapula fracture; Shoulder injury, right,  initial encounter      Additional Clinical information from EMR: Fall down 5 steps at home.     Comparison: Radiographs of the right shoulder 10/4/2020     Findings:     ROTATOR CUFF and ASSOCIATED STRUCTURES  Rotator cuff: On a background of tendinosis there is a moderate " grade  bursal sided tear of the supraspinatus at the footprint with extension  into the anterior junctional fibers of the infraspinatus as a  low-grade bursal sided tear. Subscapularis and teres minor are intact.     Bursa: Small amount of subacromial and subdeltoid bursal fluid.     Musculature: Muscle bulk of rotator cuff is preserved.  Deltoid muscle  bulk is also preserved.  No muscle edema.     Acromioclavicular joint  There are mild degenerative changes of the acromioclavicular joint.  Acromion is type 2 in sagittal morphology.  Coracoacromial ligament is  not thickened.     OSSEOUS STRUCTURES  Mildly displaced fracture of the distal scapular body/tip adjacent to  the inferior angle and violating the lateral border.     LONG BICIPITAL TENDON  The long head of the biceps tendon is normally situated within the  bicipital groove. No complete or partial biceps tendon tear is  present.     GLENOHUMERAL JOINT  Joint fluid: Physiologic amount of joint fluid is  present.     Cartilage and subarticular bone:  No focal hyaline cartilage defects  are noted. No Hill-Sachs, reverse Hill-Sachs, or bony Bankart lesions  are seen.     Labrum: Limited assessment on this study with relative lack of joint  distention shows no labral tear.     ANCILLARY FINDINGS:  Edema within the latissimus dorsi and inferoposterior serratus  anterior adjacent to the scapular fracture. Mild subscapularis edema  inferiorly, also adjacent to the fracture.                                                                      Impression:  1. Mildly displaced fracture of the inferior scapular body/tip with  adjacent edema/hematoma in the inferior subscapularis, inferoposterior  serratus anterior, and latissimus dorsi.  2. On a background of tendinosis, there is a moderate grade bursal  sided tear of the supraspinatus at the footplate with extension into  the anterior junctional fibers of the infraspinatus as a low-grade  bursal sided tear with minimal  retraction.  3. Mild degenerative changes of the acromioclavicular joint.     I have personally reviewed the examination and initial interpretation  and I agree with the findings.      Assessment:  1. Closed displaced fracture of body of right scapula, initial encounter    2. Traumatic incomplete tear of right rotator cuff, subsequent encounter    3. Injury of right shoulder, subsequent encounter      Given partial rotator cuff tear and scapula fracture, will have patient follow up with orthopedic surgery.  Can wean sling, start ROM exercises.    Plan:  - Today's Plan of Care:  Referral to an Orthopedic Surgeon  Wean sling, start gentle range of motion exercises  Work Letter    -We also discussed other future treatment options:  Referral to Physical therapy    Follow Up: with orthopedic surgery    Concerning signs and symptoms were reviewed.  The patient expressed understanding of this management plan and all questions were answered at this time.    Jessy Leggett MD CAQ  Primary Care Sports Medicine  [facility]

## 2020-10-16 NOTE — RESULT ENCOUNTER NOTE
These results were discussed during office visit.    Jessy Leggett MD, CAQ  Primary Care Sports Medicine  [facility]

## 2020-10-19 ENCOUNTER — OFFICE VISIT (OUTPATIENT)
Dept: ORTHOPEDICS | Facility: CLINIC | Age: 40
End: 2020-10-19
Attending: PEDIATRICS
Payer: COMMERCIAL

## 2020-10-19 DIAGNOSIS — S42.111A CLOSED DISPLACED FRACTURE OF BODY OF RIGHT SCAPULA, INITIAL ENCOUNTER: ICD-10-CM

## 2020-10-19 DIAGNOSIS — S49.91XD INJURY OF RIGHT SHOULDER, SUBSEQUENT ENCOUNTER: ICD-10-CM

## 2020-10-19 DIAGNOSIS — F41.9 ANXIETY: ICD-10-CM

## 2020-10-19 DIAGNOSIS — S46.011D TRAUMATIC INCOMPLETE TEAR OF RIGHT ROTATOR CUFF, SUBSEQUENT ENCOUNTER: ICD-10-CM

## 2020-10-19 PROCEDURE — 99207 PR PREOP VISIT IN GLOBAL PKG: CPT | Performed by: ORTHOPAEDIC SURGERY

## 2020-10-19 PROCEDURE — 23570 CLTX SCAPULAR FX W/O MNPJ: CPT | Mod: RT | Performed by: ORTHOPAEDIC SURGERY

## 2020-10-19 RX ORDER — ESCITALOPRAM OXALATE 10 MG/1
TABLET ORAL
Qty: 90 TABLET | Refills: 1 | Status: SHIPPED | OUTPATIENT
Start: 2020-10-19 | End: 2021-03-09

## 2020-10-19 NOTE — PROGRESS NOTES
I saw the patient with the resident or fellow.  I agree with the resident or fellow note and plan of care.  I will see her back in 2 mos with XR as part of her global followup.    Davey Alfred MD  [facility], Orthopaedic Surgery Consultation    Mary Ramirez MRN# [id number]   Age: 39 year old YOB: 1980     Reason for consult: Right shoulder/scapular pain       Requesting physician: Marlen Mccormick           Assessment and Plan:   Assessment: Mary Ramirez is a 39 year old RHD female who presents with the following problems.  1. Right inferior pole scapular body fracture    Plan:  Conservative management allowing proper time for fracture to heal and focused on maintaining shoulder range of motion  Okay to wean out of her sling over the next week and engage in activities of daily living to keep her shoulder moving  Return to clinic in 2 months time with repeat x-rays of the right shoulder 2 views, Grashey and scapular Y  In 2 months if she is having continued shoulder pain or stiffness we will then decide on if she is indicated for focused physical therapy    Samir Brambila MD 10/19/2020  Orthopaedic Surgery Resident    Patient was seen and discussed with Dr. Alfred            History of Present Illness:   Patient was seen and examined by me. History, PMH, Meds, SH, complete ROS (10 organ systems) and PE reviewed with patient and prior medical records.        39-year-old right-hand-dominant female patient presents today with a known right scapular body fracture after a mechanical fall backwards onto her staircase on 10/4/2020 when she was trying to avoid stepping on her cat.  She landed directly on her right shoulder blade and shoulder and presented to the emergency room for evaluation and imaging which demonstrated her scapular body fracture.  She was placed in a sling and was told to follow-up in our clinic today.  Over the last couple of weeks she says her pain is greatly  improved and she is even been using her shoulder for activities of daily living.  She has not been taking any narcotic pain medication.  She denies any tingling or numbness in the right upper extremity.  Her only discomfort is when she has any resistance to the elevation in front of her body.  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  She does live at home with family          Past Medical History:     Past Medical History:   Diagnosis Date     Depressive disorder, not elsewhere classified     Took Paxil for a short time     Irritable bowel syndrome      Pyelonephritis, unspecified age 14             Past Surgical History:     Past Surgical History:   Procedure Laterality Date     APPENDECTOMY       CHOLECYSTECTOMY, LAPOROSCOPIC      Cholecystectomy, Laparoscopic     FRACTURE TX, WRIST RT/LT  2003    Left wrist, closed reduction             Social History:     Social History     Socioeconomic History     Marital status: Single     Spouse name: Not on file     Number of children: 1     Years of education: Not on file     Highest education level: Not on file   Occupational History     Occupation: .     Employer: [employer]   Social Needs     Financial resource strain: Not on file     Food insecurity     Worry: Not on file     Inability: Not on file     Transportation needs     Medical: Not on file     Non-medical: Not on file   Tobacco Use     Smoking status: Former Smoker     Packs/day: 0.10     Years: 10.00     Pack years: 1.00     Types: Cigarettes     Quit date: 2003     Years since quittin.3     Smokeless tobacco: Never Used   Substance and Sexual Activity     Alcohol use: No     Comment: rare     Drug use: No     Sexual activity: Not Currently     Partners: Male   Lifestyle     Physical activity     Days per week: Not on file     Minutes per session: Not on file     Stress: Not on file   Relationships     Social connections     Talks on  phone: Not on file     Gets together: Not on file     Attends [employer]ist service: Not on file     Active member of club or organization: Not on file     Attends meetings of clubs or organizations: Not on file     Relationship status: Not on file     Intimate partner violence     Fear of current or ex partner: Not on file     Emotionally abused: Not on file     Physically abused: Not on file     Forced sexual activity: Not on file   Other Topics Concern     Not on file   Social History Narrative     Not on file             Family History:     Family History   Problem Relation Age of Onset     C.A.D. Maternal Grandfather         CABG-in his late 50's     Diabetes Maternal Grandfather      Thyroid Disease Mother         hypo     Breast Cancer Maternal Grandmother      Family History Negative Paternal Grandfather         was killed in MVA     Diabetes Paternal Aunt      C.A.D. Maternal Uncle         MI at age 43     Hypertension No family hx of      Cerebrovascular Disease No family hx of      Cancer - colorectal No family hx of      Alcohol/Drug No family hx of               Medications:     Current Outpatient Medications   Medication Sig     escitalopram (LEXAPRO) 10 MG tablet TAKE 1 TABLET BY MOUTH EVERY DAY, MAY INCREASE TO 2 TABLETS DAILY AFTER 2 WEEKS     norgestimate-ethinyl estradiol (ORTHO-CYCLEN, 28,) 0.25-35 MG-MCG tablet Take 1 tablet by mouth daily     oxyCODONE-acetaminophen (PERCOCET) 5-325 MG tablet Take 1 tablet by mouth nightly as needed for pain, moderate to severe pain or severe pain     VENTOLIN  (90 Base) MCG/ACT IN inhaler      No current facility-administered medications for this visit.              Allergies:      Allergies   Allergen Reactions     Codeine Nausea            Review of Systems:   A comprehensive 10 point review of systems (constitutional, ENT, cardiac, peripheral vascular, respiratory, GI, , Musculoskeletal, skin, Neurological) was performed and found to be negative except  as described in this note.           Physical Exam:   COMPLETE EXAMINATION:   VITAL SIGNS: There were no vitals taken for this visit.  GEN: NAD  NEURO: AOx3  CV: Warm and well perfused extremities  RESP: Non labored breathing  SKIN: Grossly normal   MUSCULOSKELETAL:   RUE.   Skin intact, no lesions.   Active ROM with 150 forward elevation and 150 ABD  60 external rotation at the side   IR slightly painful but to the low back  TTP directly over her fracture at the inferior scapula  SILT in axillary, median, ulnar, and radial nerve distributions.    Able to give thumbs up, cross fingers, make OK sign.  Radial pulse 2+          Imaging:   Review of x-rays of the right shoulder and MRI of the right scapula demonstrate an inferior pole scapular body fracture with minimal displacement without any involvement of the glenohumeral joint.  There is also some inflammation at the supraspinatus of the rotator cuff without any full-thickness tearing.  The glenohumeral joint otherwise remains anatomically reduced

## 2020-10-19 NOTE — NURSING NOTE
Reason For Visit:   Chief Complaint   Patient presents with     Consult     Right scapula fracture and rotator cuff tear.        PCP: Marlen Mccormick      ? 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 Yes.  Work status?  Full time.  Date of injury: 10/4/20   Type of injury: Fell from cat running under her feet. .  Date of surgery: None  Smoker: No  Right  hand dominant    SANE score  Affected shoulder: Right   Right shoulder SANE: 75  Left shoulder SANE: 100    There were no vitals taken for this visit.      Pain Assessment  Patient Currently in Pain: Katelynn Durant LPN

## 2020-10-19 NOTE — TELEPHONE ENCOUNTER
"Requested Prescriptions   Pending Prescriptions Disp Refills     escitalopram (LEXAPRO) 10 MG tablet [Pharmacy Med Name: ESCITALOPRAM 10 MG TABLET] 90 tablet 1     Sig: TAKE 1 TABLET BY MOUTH EVERY DAY, MAY INCREASE TO 2 TABLETS DAILY AFTER 2 WEEKS       SSRIs Protocol Passed - 10/19/2020 12:28 AM        Passed - Recent (12 mo) or future (30 days) visit within the authorizing provider's specialty     Patient has had an office visit with the authorizing provider or a provider within the authorizing providers department within the previous 12 mos or has a future within next 30 days. See \"Patient Info\" tab in inbasket, or \"Choose Columns\" in Meds & Orders section of the refill encounter.              Passed - Medication is active on med list        Passed - Patient is age 18 or older        Passed - No active pregnancy on record        Passed - No positive pregnancy test in last 12 months             "

## 2020-10-19 NOTE — LETTER
10/19/2020         RE: Mary Ramirez  [address] [facility] [address]        Dear Colleague,    Thank you for referring your patient, Mary Ramirez, to the [facility] [place]. Please see a copy of my visit note below.    I saw the patient with the resident or fellow.  I agree with the resident or fellow note and plan of care.  I will see her back in 2 mos with XR as part of her global followup.    Davey Alfred MD  [facility], Orthopaedic Surgery Consultation    Mary Ramirez MRN# [id number]   Age: 39 year old YOB: 1980     Reason for consult: Right shoulder/scapular pain       Requesting physician: Marlen Mccormick           Assessment and Plan:   Assessment: Mary Ramirez is a 39 year old RHD female who presents with the following problems.  1. Right inferior pole scapular body fracture    Plan:  Conservative management allowing proper time for fracture to heal and focused on maintaining shoulder range of motion  Okay to wean out of her sling over the next week and engage in activities of daily living to keep her shoulder moving  Return to clinic in 2 months time with repeat x-rays of the right shoulder 2 views, Grashey and scapular Y  In 2 months if she is having continued shoulder pain or stiffness we will then decide on if she is indicated for focused physical therapy    Samir Brambila MD 10/19/2020  Orthopaedic Surgery Resident    Patient was seen and discussed with Dr. Alfred            History of Present Illness:   Patient was seen and examined by me. History, PMH, Meds, SH, complete ROS (10 organ systems) and PE reviewed with patient and prior medical records.        39-year-old right-hand-dominant female patient presents today with a known right scapular body fracture after a mechanical fall backwards onto her staircase on 10/4/2020 when she was trying to avoid stepping on her cat.  She landed directly on her right  shoulder blade and shoulder and presented to the emergency room for evaluation and imaging which demonstrated her scapular body fracture.  She was placed in a sling and was told to follow-up in our clinic today.  Over the last couple of weeks she says her pain is greatly improved and she is even been using her shoulder for activities of daily living.  She has not been taking any narcotic pain medication.  She denies any tingling or numbness in the right upper extremity.  Her only discomfort is when she has any resistance to the elevation in front of her body.  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  She does live at home with family          Past Medical History:     Past Medical History:   Diagnosis Date     Depressive disorder, not elsewhere classified     Took Paxil for a short time     Irritable bowel syndrome      Pyelonephritis, unspecified age 14             Past Surgical History:     Past Surgical History:   Procedure Laterality Date     APPENDECTOMY       CHOLECYSTECTOMY, LAPOROSCOPIC      Cholecystectomy, Laparoscopic     FRACTURE TX, WRIST RT/LT  2003    Left wrist, closed reduction             Social History:     Social History     Socioeconomic History     Marital status: Single     Spouse name: Not on file     Number of children: 1     Years of education: Not on file     Highest education level: Not on file   Occupational History     Occupation: .     Employer: [employer]   Social Needs     Financial resource strain: Not on file     Food insecurity     Worry: Not on file     Inability: Not on file     Transportation needs     Medical: Not on file     Non-medical: Not on file   Tobacco Use     Smoking status: Former Smoker     Packs/day: 0.10     Years: 10.00     Pack years: 1.00     Types: Cigarettes     Quit date: 2003     Years since quittin.3     Smokeless tobacco: Never Used   Substance and Sexual Activity     Alcohol use: No      Comment: rare     Drug use: No     Sexual activity: Not Currently     Partners: Male   Lifestyle     Physical activity     Days per week: Not on file     Minutes per session: Not on file     Stress: Not on file   Relationships     Social connections     Talks on phone: Not on file     Gets together: Not on file     Attends Denominational service: Not on file     Active member of club or organization: Not on file     Attends meetings of clubs or organizations: Not on file     Relationship status: Not on file     Intimate partner violence     Fear of current or ex partner: Not on file     Emotionally abused: Not on file     Physically abused: Not on file     Forced sexual activity: Not on file   Other Topics Concern     Not on file   Social History Narrative     Not on file             Family History:     Family History   Problem Relation Age of Onset     C.A.D. Maternal Grandfather         CABG-in his late 50's     Diabetes Maternal Grandfather      Thyroid Disease Mother         hypo     Breast Cancer Maternal Grandmother      Family History Negative Paternal Grandfather         was killed in MVA     Diabetes Paternal Aunt      C.A.D. Maternal Uncle         MI at age 43     Hypertension No family hx of      Cerebrovascular Disease No family hx of      Cancer - colorectal No family hx of      Alcohol/Drug No family hx of               Medications:     Current Outpatient Medications   Medication Sig     escitalopram (LEXAPRO) 10 MG tablet TAKE 1 TABLET BY MOUTH EVERY DAY, MAY INCREASE TO 2 TABLETS DAILY AFTER 2 WEEKS     norgestimate-ethinyl estradiol (ORTHO-CYCLEN, 28,) 0.25-35 MG-MCG tablet Take 1 tablet by mouth daily     oxyCODONE-acetaminophen (PERCOCET) 5-325 MG tablet Take 1 tablet by mouth nightly as needed for pain, moderate to severe pain or severe pain     VENTOLIN  (90 Base) MCG/ACT IN inhaler      No current facility-administered medications for this visit.              Allergies:      Allergies    Allergen Reactions     Codeine Nausea            Review of Systems:   A comprehensive 10 point review of systems (constitutional, ENT, cardiac, peripheral vascular, respiratory, GI, , Musculoskeletal, skin, Neurological) was performed and found to be negative except as described in this note.           Physical Exam:   COMPLETE EXAMINATION:   VITAL SIGNS: There were no vitals taken for this visit.  GEN: NAD  NEURO: AOx3  CV: Warm and well perfused extremities  RESP: Non labored breathing  SKIN: Grossly normal   MUSCULOSKELETAL:   RUE.   Skin intact, no lesions.   Active ROM with 150 forward elevation and 150 ABD  60 external rotation at the side   IR slightly painful but to the low back  TTP directly over her fracture at the inferior scapula  SILT in axillary, median, ulnar, and radial nerve distributions.    Able to give thumbs up, cross fingers, make OK sign.  Radial pulse 2+          Imaging:   Review of x-rays of the right shoulder and MRI of the right scapula demonstrate an inferior pole scapular body fracture with minimal displacement without any involvement of the glenohumeral joint.  There is also some inflammation at the supraspinatus of the rotator cuff without any full-thickness tearing.  The glenohumeral joint otherwise remains anatomically reduced      Davey Alfred MD

## 2021-01-15 ENCOUNTER — HEALTH MAINTENANCE LETTER (OUTPATIENT)
Age: 41
End: 2021-01-15

## 2021-03-05 DIAGNOSIS — F41.9 ANXIETY: ICD-10-CM

## 2021-03-05 NOTE — TELEPHONE ENCOUNTER
"Requested Prescriptions   Pending Prescriptions Disp Refills     escitalopram (LEXAPRO) 10 MG tablet [Pharmacy Med Name: ESCITALOPRAM 10 MG TABLET] 90 tablet 1     Sig: TAKE 1 TABLET BY MOUTH EVERY DAY, MAY INCREASE TO 2 TABLETS DAILY AFTER 2 WEEKS       SSRIs Protocol Passed - 3/5/2021  9:04 AM        Passed - Recent (12 mo) or future (30 days) visit within the authorizing provider's specialty     Patient has had an office visit with the authorizing provider or a provider within the authorizing providers department within the previous 12 mos or has a future within next 30 days. See \"Patient Info\" tab in inbasket, or \"Choose Columns\" in Meds & Orders section of the refill encounter.              Passed - Medication is active on med list        Passed - Patient is age 18 or older        Passed - No active pregnancy on record        Passed - No positive pregnancy test in last 12 months             "

## 2021-03-09 RX ORDER — ESCITALOPRAM OXALATE 10 MG/1
TABLET ORAL
Qty: 90 TABLET | Refills: 1 | Status: SHIPPED | OUTPATIENT
Start: 2021-03-09 | End: 2022-11-09

## 2021-03-23 DIAGNOSIS — F41.9 ANXIETY: ICD-10-CM

## 2021-03-23 NOTE — TELEPHONE ENCOUNTER
"Requested Prescriptions   Pending Prescriptions Disp Refills     escitalopram (LEXAPRO) 10 MG tablet 90 tablet 1       SSRIs Protocol Passed - 3/23/2021  1:40 PM        Passed - Recent (12 mo) or future (30 days) visit within the authorizing provider's specialty     Patient has had an office visit with the authorizing provider or a provider within the authorizing providers department within the previous 12 mos or has a future within next 30 days. See \"Patient Info\" tab in inbasket, or \"Choose Columns\" in Meds & Orders section of the refill encounter.              Passed - Medication is active on med list        Passed - Patient is age 18 or older        Passed - No active pregnancy on record        Passed - No positive pregnancy test in last 12 months             "

## 2021-03-25 RX ORDER — ESCITALOPRAM OXALATE 10 MG/1
TABLET ORAL
Qty: 90 TABLET | Refills: 1 | OUTPATIENT
Start: 2021-03-25

## 2021-09-04 ENCOUNTER — HEALTH MAINTENANCE LETTER (OUTPATIENT)
Age: 41
End: 2021-09-04

## 2022-02-19 ENCOUNTER — HEALTH MAINTENANCE LETTER (OUTPATIENT)
Age: 42
End: 2022-02-19

## 2022-08-04 ENCOUNTER — OFFICE VISIT (OUTPATIENT)
Dept: FAMILY MEDICINE | Facility: CLINIC | Age: 42
End: 2022-08-04
Payer: COMMERCIAL

## 2022-08-04 VITALS
BODY MASS INDEX: 28.87 KG/M2 | HEIGHT: 69 IN | SYSTOLIC BLOOD PRESSURE: 138 MMHG | TEMPERATURE: 98.3 F | RESPIRATION RATE: 18 BRPM | DIASTOLIC BLOOD PRESSURE: 88 MMHG | OXYGEN SATURATION: 98 % | HEART RATE: 79 BPM | WEIGHT: 194.9 LBS

## 2022-08-04 DIAGNOSIS — R53.83 FATIGUE, UNSPECIFIED TYPE: ICD-10-CM

## 2022-08-04 DIAGNOSIS — R07.89 CHEST DISCOMFORT: Primary | ICD-10-CM

## 2022-08-04 DIAGNOSIS — R09.A2 GLOBUS SENSATION: ICD-10-CM

## 2022-08-04 LAB
ANION GAP SERPL CALCULATED.3IONS-SCNC: 5 MMOL/L (ref 3–14)
BUN SERPL-MCNC: 11 MG/DL (ref 7–30)
CALCIUM SERPL-MCNC: 9.3 MG/DL (ref 8.5–10.1)
CHLORIDE BLD-SCNC: 104 MMOL/L (ref 94–109)
CO2 SERPL-SCNC: 28 MMOL/L (ref 20–32)
CREAT SERPL-MCNC: 0.63 MG/DL (ref 0.52–1.04)
ERYTHROCYTE [DISTWIDTH] IN BLOOD BY AUTOMATED COUNT: 12.4 % (ref 10–15)
GFR SERPL CREATININE-BSD FRML MDRD: >90 ML/MIN/1.73M2
GLUCOSE BLD-MCNC: 99 MG/DL (ref 70–99)
HCT VFR BLD AUTO: 42.1 % (ref 35–47)
HGB BLD-MCNC: 14.1 G/DL (ref 11.7–15.7)
MCH RBC QN AUTO: 31.1 PG (ref 26.5–33)
MCHC RBC AUTO-ENTMCNC: 33.5 G/DL (ref 31.5–36.5)
MCV RBC AUTO: 93 FL (ref 78–100)
PLATELET # BLD AUTO: 219 10E3/UL (ref 150–450)
POTASSIUM BLD-SCNC: 4 MMOL/L (ref 3.4–5.3)
RBC # BLD AUTO: 4.54 10E6/UL (ref 3.8–5.2)
SODIUM SERPL-SCNC: 137 MMOL/L (ref 133–144)
TSH SERPL DL<=0.005 MIU/L-ACNC: 3.05 MU/L (ref 0.4–4)
WBC # BLD AUTO: 7.7 10E3/UL (ref 4–11)

## 2022-08-04 PROCEDURE — 85027 COMPLETE CBC AUTOMATED: CPT | Performed by: NURSE PRACTITIONER

## 2022-08-04 PROCEDURE — 99214 OFFICE O/P EST MOD 30 MIN: CPT | Performed by: NURSE PRACTITIONER

## 2022-08-04 PROCEDURE — 84443 ASSAY THYROID STIM HORMONE: CPT | Performed by: NURSE PRACTITIONER

## 2022-08-04 PROCEDURE — 93000 ELECTROCARDIOGRAM COMPLETE: CPT | Performed by: NURSE PRACTITIONER

## 2022-08-04 PROCEDURE — 80048 BASIC METABOLIC PNL TOTAL CA: CPT | Performed by: NURSE PRACTITIONER

## 2022-08-04 PROCEDURE — 36415 COLL VENOUS BLD VENIPUNCTURE: CPT | Performed by: NURSE PRACTITIONER

## 2022-08-04 ASSESSMENT — ANXIETY QUESTIONNAIRES
7. FEELING AFRAID AS IF SOMETHING AWFUL MIGHT HAPPEN: NOT AT ALL
2. NOT BEING ABLE TO STOP OR CONTROL WORRYING: NOT AT ALL
GAD7 TOTAL SCORE: 0
3. WORRYING TOO MUCH ABOUT DIFFERENT THINGS: NOT AT ALL
6. BECOMING EASILY ANNOYED OR IRRITABLE: NOT AT ALL
7. FEELING AFRAID AS IF SOMETHING AWFUL MIGHT HAPPEN: NOT AT ALL
1. FEELING NERVOUS, ANXIOUS, OR ON EDGE: NOT AT ALL
GAD7 TOTAL SCORE: 0
4. TROUBLE RELAXING: NOT AT ALL
GAD7 TOTAL SCORE: 0
5. BEING SO RESTLESS THAT IT IS HARD TO SIT STILL: NOT AT ALL

## 2022-08-04 ASSESSMENT — PATIENT HEALTH QUESTIONNAIRE - PHQ9
SUM OF ALL RESPONSES TO PHQ QUESTIONS 1-9: 1
10. IF YOU CHECKED OFF ANY PROBLEMS, HOW DIFFICULT HAVE THESE PROBLEMS MADE IT FOR YOU TO DO YOUR WORK, TAKE CARE OF THINGS AT HOME, OR GET ALONG WITH OTHER PEOPLE: NOT DIFFICULT AT ALL
SUM OF ALL RESPONSES TO PHQ QUESTIONS 1-9: 1

## 2022-08-04 ASSESSMENT — PAIN SCALES - GENERAL: PAINLEVEL: NO PAIN (0)

## 2022-08-04 NOTE — PATIENT INSTRUCTIONS
EKG today looks good.  We will check some labs and I will let you know what they show.  Start omeprazole daily (sent to pharmacy) 30 minutes before breakfast.  Schedule follow up with ENT if things are not improving.

## 2022-08-04 NOTE — PROGRESS NOTES
"  Assessment & Plan     Chest discomfort  EKG was obtained given chest discomfort, sinus rhythm without ischemic changes noted.  Reassurance is provided that I think it is unlikely that symptoms are an ACS equivalent.  - EKG 12-lead complete w/read - Clinics    Globus sensation  Suspect this is GERD/laryngeal tracheal reflux.  We will have her start on daily omeprazole.  I did place an ENT referral in case symptoms are not resolving, but discussed that if things are improved, she can cancel this appointment.  - omeprazole (PRILOSEC) 20 MG DR capsule; Take 1 capsule (20 mg) by mouth daily  - Adult ENT  Referral; Future    Fatigue, unspecified type  We will check basic labs today to further evaluate.  - CBC with platelets; Future  - Basic metabolic panel  (Ca, Cl, CO2, Creat, Gluc, K, Na, BUN); Future  - TSH with free T4 reflex; Future    }     Nicotine/Tobacco Cessation:  She reports that she has been smoking cigarettes. She has a 1.00 pack-year smoking history. She has never used smokeless tobacco.  Nicotine/Tobacco Cessation Plan:         BMI:   Estimated body mass index is 29.2 kg/m  as calculated from the following:    Height as of this encounter: 1.74 m (5' 8.5\").    Weight as of this encounter: 88.4 kg (194 lb 14.4 oz).       Patient Instructions   EKG today looks good.  We will check some labs and I will let you know what they show.  Start omeprazole daily (sent to pharmacy) 30 minutes before breakfast.  Schedule follow up with ENT if things are not improving.      Return in about 1 week (around 8/11/2022) for worsening or continued symptoms.    ANNE Meyer [facility]    Ximena Hernandez is a 41 year old, presenting for the following health issues:  Heartburn (Acid reflux)      History of Present Illness       Reason for visit:  Heartburn or gas. Over the counter medication doesn t help  Symptom onset:  1-3 days ago    She eats 2-3 servings of fruits and " vegetables daily.She consumes 1 sweetened beverage(s) daily.She exercises with enough effort to increase her heart rate 10 to 19 minutes per day.  She exercises with enough effort to increase her heart rate 3 or less days per week.   She is taking medications regularly.    Today's PHQ-9         PHQ-9 Total Score: 1    PHQ-9 Q9 Thoughts of better off dead/self-harm past 2 weeks :   Not at all    How difficult have these problems made it for you to do your work, take care of things at home, or get along with other people: Not difficult at all  Today's HAL-7 Score: 0       GERD/Heartburn  Onset/Duration: 3 days  Description: feels like there is air or something stuck in her throat and gets a nervous fluttery feeling in her heart, She has been burping more than usual  Intensity: mild, uncomfortable not having pain per se   Progression of Symptoms: same  Accompanying Signs & Symptoms:  Does it feel like food gets stuck or trouble swallowing: YES  Nausea: YES  Vomiting (bloody?): No  Abdominal Pain: No  Black-Tarry stools: No  Bloody stools: No  History:  Previous similar episodes: Not since having gallbladder removed 25 years ago  Previous ulcers: No  Precipitating factors:   Caffeine use: YES- a coffee in the morning, tried a new coffee on Monday when the symptoms began. Hasn't been using caffeine for a couple days now and is still having the same feelings   Alcohol use: YES- but hasn't had any for 2.5 weeks  NSAID/Aspirin use: YES- uses Ibuprofen PRN  Tobacco use: YES- on occasion, socially  Worse with no particular food or drink. Has been eating pretty bland the last few days to try and help  Alleviating factors: None  Therapies tried and outcome:             Lifestyle changes: eating bland diet doesn't have any effect            Medications: Omeprazole (Prilosec) and gas-x  -didn't help    Above HPI reviewed. Additionally, reports globus sensation for the past 3 days.  She has had a discomfort in her chest as well as  "increased gas and burping.  She denies heartburn sensation.  Food does not seem to affect the sensation.  She is able to swallow both food and fluids without difficulty.  She is not coughing.  No vomiting or hematemesis.  No previous history of significant GERD.  She also notes that she has been significantly fatigued recently.  Unsure if this is related.  She worries that this could be cardiac related as she has had some chest discomfort.  No shortness of breath or dyspnea on exertion.  No exertional chest pain.  No swelling of the lower extremities.        Review of Systems   Constitutional, HEENT, cardiovascular, pulmonary, gi and gu systems are negative, except as otherwise noted.      Objective    /88 (BP Location: Left arm, Patient Position: Sitting, Cuff Size: Adult Regular)   Pulse 79   Temp 98.3  F (36.8  C) (Tympanic)   Resp 18   Ht 1.74 m (5' 8.5\")   Wt 88.4 kg (194 lb 14.4 oz)   LMP  (LMP Unknown)   SpO2 98%   Breastfeeding No   BMI 29.20 kg/m    Body mass index is 29.2 kg/m .  Physical Exam  Vitals and nursing note reviewed.   Constitutional:       Appearance: Normal appearance.   HENT:      Head: Normocephalic and atraumatic.      Mouth/Throat:      Mouth: Mucous membranes are moist.      Pharynx: Oropharynx is clear.   Eyes:      Comments: Non-icteric   Neck:      Thyroid: No thyroid mass, thyromegaly or thyroid tenderness.   Cardiovascular:      Rate and Rhythm: Normal rate and regular rhythm.      Pulses: Normal pulses.      Heart sounds: Normal heart sounds, S1 normal and S2 normal. Heart sounds not distant. No murmur heard.    No friction rub. No gallop.   Pulmonary:      Effort: Pulmonary effort is normal.      Breath sounds: Normal breath sounds.   Abdominal:      General: Abdomen is flat. Bowel sounds are normal.      Palpations: Abdomen is soft.   Musculoskeletal:      Cervical back: Neck supple.      Right lower leg: No edema.      Left lower leg: No edema.   Lymphadenopathy: "      Cervical: No cervical adenopathy.   Skin:     General: Skin is warm and dry.      Capillary Refill: Capillary refill takes less than 2 seconds.   Neurological:      General: No focal deficit present.      Mental Status: She is alert and oriented to person, place, and time.   Psychiatric:         Mood and Affect: Mood normal.         Behavior: Behavior normal.         Thought Content: Thought content normal.         Judgment: Judgment normal.            EKG - Reviewed and interpreted by me appears normal, NSR, normal axis, normal intervals, no acute ST/T changes c/w ischemia, no LVH by voltage criteria, there are no prior tracings available                .  ..

## 2022-09-20 ENCOUNTER — TELEPHONE (OUTPATIENT)
Dept: FAMILY MEDICINE | Facility: CLINIC | Age: 42
End: 2022-09-20

## 2022-09-20 NOTE — TELEPHONE ENCOUNTER
Patient Quality Outreach    Patient is due for the following:   Cervical Cancer Screening - PAP Needed  Physical Preventive Adult Physical    Next Steps:   Schedule a Adult Preventative    Type of outreach:    Sent letter.      Questions for provider review:    None     Dinah Stokes [facility]  Chart routed to none, letter sent.

## 2022-09-20 NOTE — LETTER
September 20, 2022      Mary Ramirez  [address] [address]        Dear Mary,     September 20, 2022      Mary JENNIFER Ramirez  [address] [address]      Your healthcare team cares about your health. To provide you with the best care,   we have reviewed your chart and based on our findings, we see that you are due to:     - CERVICAL CANCER SCREENING: Schedule a Cervical Cancer Screening, with Pap and wellness exam.     If you have already completed these items, please contact the clinic via phone or   Mychart so your care team can review and update your records. Thank you for   choosing [facility] for your healthcare needs. For any questions,   concerns, or to schedule an appointment please contact the clinic.     Healthy Regards,    Your [facility] Care Team    Sincerely,    Desirae Lazaro MD/romulo

## 2022-10-22 ENCOUNTER — HEALTH MAINTENANCE LETTER (OUTPATIENT)
Age: 42
End: 2022-10-22

## 2022-11-09 ENCOUNTER — OFFICE VISIT (OUTPATIENT)
Dept: FAMILY MEDICINE | Facility: CLINIC | Age: 42
End: 2022-11-09
Payer: COMMERCIAL

## 2022-11-09 VITALS
DIASTOLIC BLOOD PRESSURE: 80 MMHG | WEIGHT: 190.5 LBS | TEMPERATURE: 98.3 F | OXYGEN SATURATION: 100 % | HEIGHT: 69 IN | RESPIRATION RATE: 16 BRPM | SYSTOLIC BLOOD PRESSURE: 128 MMHG | HEART RATE: 86 BPM | BODY MASS INDEX: 28.22 KG/M2

## 2022-11-09 DIAGNOSIS — Z00.00 HEALTHCARE MAINTENANCE: Primary | ICD-10-CM

## 2022-11-09 DIAGNOSIS — F41.9 ANXIETY: ICD-10-CM

## 2022-11-09 LAB
ALBUMIN SERPL BCG-MCNC: 5.2 G/DL (ref 3.5–5.2)
ALP SERPL-CCNC: 75 U/L (ref 35–104)
ALT SERPL W P-5'-P-CCNC: 30 U/L (ref 10–35)
ANION GAP SERPL CALCULATED.3IONS-SCNC: 10 MMOL/L (ref 7–15)
AST SERPL W P-5'-P-CCNC: 23 U/L (ref 10–35)
BILIRUB SERPL-MCNC: 0.5 MG/DL
BUN SERPL-MCNC: 10 MG/DL (ref 6–20)
CALCIUM SERPL-MCNC: 10.4 MG/DL (ref 8.6–10)
CHLORIDE SERPL-SCNC: 101 MMOL/L (ref 98–107)
CREAT SERPL-MCNC: 0.71 MG/DL (ref 0.51–0.95)
DEPRECATED CALCIDIOL+CALCIFEROL SERPL-MC: 21 UG/L (ref 20–75)
DEPRECATED HCO3 PLAS-SCNC: 27 MMOL/L (ref 22–29)
ERYTHROCYTE [DISTWIDTH] IN BLOOD BY AUTOMATED COUNT: 12.9 % (ref 10–15)
GFR SERPL CREATININE-BSD FRML MDRD: >90 ML/MIN/1.73M2
GLUCOSE SERPL-MCNC: 104 MG/DL (ref 70–99)
HCT VFR BLD AUTO: 43.3 % (ref 35–47)
HGB BLD-MCNC: 14.6 G/DL (ref 11.7–15.7)
MCH RBC QN AUTO: 30.3 PG (ref 26.5–33)
MCHC RBC AUTO-ENTMCNC: 33.7 G/DL (ref 31.5–36.5)
MCV RBC AUTO: 90 FL (ref 78–100)
PLATELET # BLD AUTO: 241 10E3/UL (ref 150–450)
POTASSIUM SERPL-SCNC: 3.9 MMOL/L (ref 3.4–5.3)
PROT SERPL-MCNC: 8.3 G/DL (ref 6.4–8.3)
RBC # BLD AUTO: 4.82 10E6/UL (ref 3.8–5.2)
SODIUM SERPL-SCNC: 138 MMOL/L (ref 136–145)
TSH SERPL DL<=0.005 MIU/L-ACNC: 3.47 UIU/ML (ref 0.3–4.2)
WBC # BLD AUTO: 7.1 10E3/UL (ref 4–11)

## 2022-11-09 PROCEDURE — 82306 VITAMIN D 25 HYDROXY: CPT | Performed by: FAMILY MEDICINE

## 2022-11-09 PROCEDURE — 0124A COVID-19,PF,PFIZER BOOSTER BIVALENT: CPT | Performed by: FAMILY MEDICINE

## 2022-11-09 PROCEDURE — 80053 COMPREHEN METABOLIC PANEL: CPT | Performed by: FAMILY MEDICINE

## 2022-11-09 PROCEDURE — 85027 COMPLETE CBC AUTOMATED: CPT | Performed by: FAMILY MEDICINE

## 2022-11-09 PROCEDURE — 36415 COLL VENOUS BLD VENIPUNCTURE: CPT | Performed by: FAMILY MEDICINE

## 2022-11-09 PROCEDURE — 91312 COVID-19,PF,PFIZER BOOSTER BIVALENT: CPT | Performed by: FAMILY MEDICINE

## 2022-11-09 PROCEDURE — 90686 IIV4 VACC NO PRSV 0.5 ML IM: CPT | Performed by: FAMILY MEDICINE

## 2022-11-09 PROCEDURE — 90471 IMMUNIZATION ADMIN: CPT | Performed by: FAMILY MEDICINE

## 2022-11-09 PROCEDURE — 84443 ASSAY THYROID STIM HORMONE: CPT | Performed by: FAMILY MEDICINE

## 2022-11-09 PROCEDURE — 99214 OFFICE O/P EST MOD 30 MIN: CPT | Mod: 25 | Performed by: FAMILY MEDICINE

## 2022-11-09 RX ORDER — HYDROXYZINE HYDROCHLORIDE 25 MG/1
25 TABLET, FILM COATED ORAL 3 TIMES DAILY PRN
Qty: 30 TABLET | Refills: 1 | Status: SHIPPED | OUTPATIENT
Start: 2022-11-09 | End: 2023-03-14

## 2022-11-09 RX ORDER — ESCITALOPRAM OXALATE 10 MG/1
TABLET ORAL
Qty: 90 TABLET | Refills: 1 | Status: SHIPPED | OUTPATIENT
Start: 2022-11-09 | End: 2023-02-10

## 2022-11-09 RX ORDER — LORAZEPAM 0.5 MG/1
0.5 TABLET ORAL EVERY 6 HOURS PRN
Qty: 15 TABLET | Refills: 0 | Status: SHIPPED | OUTPATIENT
Start: 2022-11-09 | End: 2023-02-10

## 2022-11-09 ASSESSMENT — ANXIETY QUESTIONNAIRES
5. BEING SO RESTLESS THAT IT IS HARD TO SIT STILL: MORE THAN HALF THE DAYS
GAD7 TOTAL SCORE: 17
4. TROUBLE RELAXING: MORE THAN HALF THE DAYS
7. FEELING AFRAID AS IF SOMETHING AWFUL MIGHT HAPPEN: NEARLY EVERY DAY
GAD7 TOTAL SCORE: 17
8. IF YOU CHECKED OFF ANY PROBLEMS, HOW DIFFICULT HAVE THESE MADE IT FOR YOU TO DO YOUR WORK, TAKE CARE OF THINGS AT HOME, OR GET ALONG WITH OTHER PEOPLE?: VERY DIFFICULT
6. BECOMING EASILY ANNOYED OR IRRITABLE: MORE THAN HALF THE DAYS
GAD7 TOTAL SCORE: 17
1. FEELING NERVOUS, ANXIOUS, OR ON EDGE: NEARLY EVERY DAY
2. NOT BEING ABLE TO STOP OR CONTROL WORRYING: MORE THAN HALF THE DAYS
3. WORRYING TOO MUCH ABOUT DIFFERENT THINGS: NEARLY EVERY DAY
IF YOU CHECKED OFF ANY PROBLEMS ON THIS QUESTIONNAIRE, HOW DIFFICULT HAVE THESE PROBLEMS MADE IT FOR YOU TO DO YOUR WORK, TAKE CARE OF THINGS AT HOME, OR GET ALONG WITH OTHER PEOPLE: VERY DIFFICULT
7. FEELING AFRAID AS IF SOMETHING AWFUL MIGHT HAPPEN: NEARLY EVERY DAY

## 2022-11-09 ASSESSMENT — PATIENT HEALTH QUESTIONNAIRE - PHQ9
SUM OF ALL RESPONSES TO PHQ QUESTIONS 1-9: 17
10. IF YOU CHECKED OFF ANY PROBLEMS, HOW DIFFICULT HAVE THESE PROBLEMS MADE IT FOR YOU TO DO YOUR WORK, TAKE CARE OF THINGS AT HOME, OR GET ALONG WITH OTHER PEOPLE: VERY DIFFICULT
SUM OF ALL RESPONSES TO PHQ QUESTIONS 1-9: 17

## 2022-11-09 NOTE — PROGRESS NOTES
Answers for HPI/ROS submitted by the patient on 11/9/2022  If you checked off any problems, how difficult have these problems made it for you to do your work, take care of things at home, or get along with other people?: Very difficult  PHQ9 TOTAL SCORE: 17  HAL 7 TOTAL SCORE: 17  Depression/Anxiety: Anxiety  Anxiety since last: : worse  Other associated symotome: : Yes  Significant life event: : job concerns  Anxious:: Yes  Current substance use:: No  How many servings of fruits and vegetables do you eat daily?: 2-3  On average, how many sweetened beverages do you drink each day (Examples: soda, juice, sweet tea, etc.  Do NOT count diet or artificially sweetened beverages)?: 1  How many minutes a day do you exercise enough to make your heart beat faster?: 10 to 19  How many days a week do you exercise enough to make your heart beat faster?: 4  How many days per week do you miss taking your medication?: 0    Assessment/Plan:    Mary Ramirez is a 42 year old female presenting for:    Healthcare maintenance  - INFLUENZA VACCINE IM > 6 MONTHS VALENT IIV4 (AFLURIA/FLUZONE)  - COVID-19,PF,PFIZER BOOSTER BIVALENT (12+YRS)    Anxiety  Lexapro sent to the pharmacy.  Discussed risk and benefits of medication.  Should start with 5 mg daily for 10 days and then increase to 10 mg daily.  Ativan and hydroxyzine sent as well.  Laboratory testing below will be done.  Discussed risk and benefits of all medications.  She will follow-up with me in 2 to 3 months for complete physical examination.  - escitalopram (LEXAPRO) 10 MG tablet  Dispense: 90 tablet; Refill: 1  - LORazepam (ATIVAN) 0.5 MG tablet  Dispense: 15 tablet; Refill: 0  - hydrOXYzine (ATARAX) 25 MG tablet  Dispense: 30 tablet; Refill: 1  - Comprehensive metabolic panel (BMP + Alb, Alk Phos, ALT, AST, Total. Bili, TP)  - TSH with free T4 reflex  - CBC with platelets  - Vitamin D Deficiency  - Comprehensive metabolic panel (BMP + Alb, Alk Phos, ALT, AST, Total. Bili,  TP)  - TSH with free T4 reflex  - CBC with platelets  - Vitamin D Deficiency      Medications Discontinued During This Encounter   Medication Reason     omeprazole (PRILOSEC) 20 MG DR capsule      omeprazole (PRILOSEC) 20 MG DR capsule      escitalopram (LEXAPRO) 10 MG tablet            Chief Complaint:  Anxiety        Subjective:   Mary Ramirez is a very pleasant 42-year-old female who presents to the clinic today to discuss anxiety.    Patient notes that she has a past medical history significant for anxiety even when she was a teenager.  She states that she was placed on Paxil after her birth of her daughter (who is 16 years old) and states that she did not do well with the medication.  Otherwise, she has learned some lifestyle modifications to deal with her symptoms however most recently she noted that her symptoms have worsened fairly significantly.  She is very stressed at work and doing the work for several people as they are short staffed.  She recently sent a message to her supervisor that she needs help at work.  Otherwise, she is seeing a therapist for the last few weeks and has found that to be helpful as well.  She has used Ativan in the past and finds it to be beneficial.    Her main symptoms are severe anxiety, irritability and crying.  She does not feel depressed but notes that small things will set her off and she will either have a panic attack or crying episode.  She needed to take a few days off of work last week due to her symptoms as well.      12 point review of systems completed and negative except for what has been described above    History   Smoking Status     Some Days     Packs/day: 0.10     Years: 10.00     Types: Cigarettes     Last attempt to quit: 6/29/2003   Smokeless Tobacco     Never         Current Outpatient Medications:      escitalopram (LEXAPRO) 10 MG tablet, Take 0.5 tablets (5 mg) by mouth daily for 10 days, THEN 1 tablet (10 mg) daily for 80 days., Disp: 90 tablet, Rfl:  "1     hydrOXYzine (ATARAX) 25 MG tablet, Take 1 tablet (25 mg) by mouth 3 times daily as needed for anxiety, Disp: 30 tablet, Rfl: 1     LORazepam (ATIVAN) 0.5 MG tablet, Take 1 tablet (0.5 mg) by mouth every 6 hours as needed for anxiety, Disp: 15 tablet, Rfl: 0      Objective:  Vitals:    11/09/22 0949   BP: 128/80   Pulse: 86   Resp: 16   Temp: 98.3  F (36.8  C)   TempSrc: Tympanic   SpO2: 100%   Weight: 86.4 kg (190 lb 8 oz)   Height: 1.74 m (5' 8.5\")       Body mass index is 28.54 kg/m .    Vital signs reviewed and stable  General: No acute distress  Psych: Appropriate affect  HEENT: moist mucous membranes, pupils equal, round, reactive to light and accomodation, tympanic membranes are pearly grey bilaterally  Lymph: no cervical or supraclavicular lymphadenopathy  Cardiovascular: regular rate and rhythm with no murmur  Pulmonary: clear to auscultation bilaterally with no wheeze  Abdomen: soft, non tender, non distended with normo-active bowel sounds  Extremities: warm and well perfused with no edema  Skin: warm and dry with no rash         This note has been dictated and transcribed using voice recognition software.   Any errors in transcription are unintentional and inherent to the software.  "

## 2023-02-10 ENCOUNTER — OFFICE VISIT (OUTPATIENT)
Dept: FAMILY MEDICINE | Facility: CLINIC | Age: 43
End: 2023-02-10
Payer: COMMERCIAL

## 2023-02-10 VITALS
HEART RATE: 77 BPM | TEMPERATURE: 97.4 F | BODY MASS INDEX: 27.49 KG/M2 | DIASTOLIC BLOOD PRESSURE: 84 MMHG | WEIGHT: 185.6 LBS | SYSTOLIC BLOOD PRESSURE: 138 MMHG | OXYGEN SATURATION: 99 % | HEIGHT: 69 IN

## 2023-02-10 DIAGNOSIS — Z11.59 NEED FOR HEPATITIS C SCREENING TEST: ICD-10-CM

## 2023-02-10 DIAGNOSIS — Z00.00 HEALTHCARE MAINTENANCE: Primary | ICD-10-CM

## 2023-02-10 DIAGNOSIS — F41.9 ANXIETY: ICD-10-CM

## 2023-02-10 DIAGNOSIS — Z12.31 VISIT FOR SCREENING MAMMOGRAM: ICD-10-CM

## 2023-02-10 DIAGNOSIS — Z12.4 CERVICAL CANCER SCREENING: ICD-10-CM

## 2023-02-10 LAB
CHOLEST SERPL-MCNC: 209 MG/DL
HDLC SERPL-MCNC: 54 MG/DL
LDLC SERPL CALC-MCNC: 139 MG/DL
NONHDLC SERPL-MCNC: 155 MG/DL
TRIGL SERPL-MCNC: 81 MG/DL

## 2023-02-10 PROCEDURE — G0145 SCR C/V CYTO,THINLAYER,RESCR: HCPCS | Performed by: FAMILY MEDICINE

## 2023-02-10 PROCEDURE — 86803 HEPATITIS C AB TEST: CPT | Performed by: FAMILY MEDICINE

## 2023-02-10 PROCEDURE — 99396 PREV VISIT EST AGE 40-64: CPT | Performed by: FAMILY MEDICINE

## 2023-02-10 PROCEDURE — 36415 COLL VENOUS BLD VENIPUNCTURE: CPT | Performed by: FAMILY MEDICINE

## 2023-02-10 PROCEDURE — 87624 HPV HI-RISK TYP POOLED RSLT: CPT | Performed by: FAMILY MEDICINE

## 2023-02-10 PROCEDURE — 80061 LIPID PANEL: CPT | Performed by: FAMILY MEDICINE

## 2023-02-10 RX ORDER — LORAZEPAM 0.5 MG/1
0.5 TABLET ORAL EVERY 6 HOURS PRN
Qty: 15 TABLET | Refills: 0 | Status: SHIPPED | OUTPATIENT
Start: 2023-02-10 | End: 2023-09-19

## 2023-02-10 RX ORDER — ESCITALOPRAM OXALATE 10 MG/1
15 TABLET ORAL DAILY
Qty: 135 TABLET | Refills: 0 | Status: SHIPPED | OUTPATIENT
Start: 2023-02-10 | End: 2023-05-04

## 2023-02-10 ASSESSMENT — ENCOUNTER SYMPTOMS
PARESTHESIAS: 0
MYALGIAS: 0
DYSURIA: 0
ABDOMINAL PAIN: 0
DIARRHEA: 0
HEADACHES: 0
HEARTBURN: 0
CHILLS: 0
COUGH: 0
CONSTIPATION: 0
FEVER: 0
EYE PAIN: 0
NERVOUS/ANXIOUS: 0
ARTHRALGIAS: 0
FREQUENCY: 0
HEMATURIA: 0
HEMATOCHEZIA: 0
JOINT SWELLING: 0
SHORTNESS OF BREATH: 0
WEAKNESS: 0
NAUSEA: 1
DIZZINESS: 0
PALPITATIONS: 0
BREAST MASS: 0

## 2023-02-10 NOTE — PROGRESS NOTES
SUBJECTIVE:   CC: Mary is an 42 year old who presents for preventive health visit.     Patient has been advised of split billing requirements and indicates understanding: Yes     Healthy Habits:     Getting at least 3 servings of Calcium per day:  Yes    Bi-annual eye exam:  NO    Dental care twice a year:  NO    Sleep apnea or symptoms of sleep apnea:  None    Diet:  Regular (no restrictions)    Frequency of exercise:  2-3 days/week    Duration of exercise:  15-30 minutes    Taking medications regularly:  Yes    Medication side effects:  Other    PHQ-2 Total Score: 0    Additional concerns today:  Yes    Concerns:  * follow up on anxiety  * nausea    Anxiety is feeling much improved with the Lexapro.  She does note that it is making her a bit nauseated.  She wonders about increasing dose as she feels that the nausea has started to decrease slightly.    Today's PHQ-2 Score:   PHQ-2 (  [employer]) 2/10/2023   Q1: Little interest or pleasure in doing things 0   Q2: Feeling down, depressed or hopeless 0   PHQ-2 Score 0   PHQ-2 Total Score (12-17 Years)- Positive if 3 or more points; Administer PHQ-A if positive -   Q1: Little interest or pleasure in doing things Not at all   Q2: Feeling down, depressed or hopeless Not at all   PHQ-2 Score 0       Have you ever done Advance Care Planning? (For example, a Health Directive, POLST, or a discussion with a medical provider or your loved ones about your wishes): Yes, advance care planning is on file.    Social History     Tobacco Use     Smoking status: Some Days     Packs/day: 0.10     Years: 10.00     Pack years: 1.00     Types: Cigarettes     Last attempt to quit: 2003     Years since quittin.6     Smokeless tobacco: Never   Substance Use Topics     Alcohol use: Yes     Comment: rare         Alcohol Use 2/10/2023   Prescreen: >3 drinks/day or >7 drinks/week? Not Applicable       Reviewed orders with patient.  Reviewed health maintenance and updated orders  accordingly - Yes  Lab work is in process    Breast Cancer Screening:  Any new diagnosis of family breast, ovarian, or bowel cancer? No    FHS-7:   Breast CA Risk Assessment (FHS-7) 2/10/2023   Did any of your first-degree relatives have breast or ovarian cancer? No   Did any of your relatives have bilateral breast cancer? Unknown   Did any man in your family have breast cancer? No   Did any woman in your family have breast and ovarian cancer? Yes   Did any woman in your family have breast cancer before age 50 y? Unknown   Do you have 2 or more relatives with breast and/or ovarian cancer? No   Do you have 2 or more relatives with breast and/or bowel cancer? No       Mammogram Screening - Offered annual screening and updated Health Maintenance for [place] plan based on risk factor consideration    Pertinent mammograms are reviewed under the imaging tab.    History of abnormal Pap smear: NO - age 30-65 PAP every 5 years with negative HPV co-testing recommended  PAP / HPV 9/13/2007 4/5/2006 3/25/2005   PAP (Historical) NIL NIL NIL     Reviewed and updated as needed this visit by clinical staff    Allergies  Meds              Reviewed and updated as needed this visit by Provider                     Review of Systems   Constitutional: Negative for chills and fever.   HENT: Negative for congestion, ear pain and hearing loss.    Eyes: Negative for pain and visual disturbance.   Respiratory: Negative for cough and shortness of breath.    Cardiovascular: Negative for chest pain, palpitations and peripheral edema.   Gastrointestinal: Positive for nausea. Negative for abdominal pain, constipation, diarrhea, heartburn and hematochezia.   Breasts:  Negative for tenderness, breast mass and discharge.   Genitourinary: Negative for dysuria, frequency, genital sores, hematuria, pelvic pain, urgency, vaginal bleeding and vaginal discharge.   Musculoskeletal: Negative for arthralgias, joint swelling and myalgias.   Skin: Negative for  "rash.   Neurological: Negative for dizziness, weakness, headaches and paresthesias.   Psychiatric/Behavioral: Negative for mood changes. The patient is not nervous/anxious.           OBJECTIVE:   /84   Pulse 77   Temp 97.4  F (36.3  C) (Tympanic)   Ht 1.746 m (5' 8.75\")   Wt 84.2 kg (185 lb 9.6 oz)   LMP 01/09/2023   SpO2 99%   BMI 27.61 kg/m    Physical Exam    Physical Exam:  General Appearance: Alert, cooperative, no distress, appears stated age   Head: Normocephalic, without obvious abnormality, atraumatic  Eyes: PERRL, conjunctiva/corneas clear, EOM's intact   Ears: Normal TM's and external ear canals, both ears  Neck: Supple, symmetrical, trachea midline, no adenopathy;  thyroid: not enlarged, symmetric, no tenderness/mass/nodules  Back: Symmetric, no curvature, ROM normal,  Lungs: Clear to auscultation bilaterally, respirations unlabored  Breasts: No breast masses, tenderness, asymmetry, or nipple discharge.  Heart: Regular rate and rhythm, S1 and S2 normal, no murmur, rub, or gallop  Abdomen: Soft, non-tender, bowel sounds active all four quadrants,  no masses, no organomegaly  Pelvic:normal external female genitalia, normal appearing vaginal mucosa and cervix  Extremities: Extremities normal, atraumatic, no cyanosis or edema  Skin: Skin color, texture, turgor normal, no rashes or lesions  Lymph nodes: Cervical, supraclavicular, and axillary nodes normal and   Neurologic: Normal        ASSESSMENT/PLAN:   1. Healthcare maintenance  - REVIEW OF HEALTH MAINTENANCE PROTOCOL ORDERS  - Lipid panel reflex to direct LDL Fasting; Future  - Lipid panel reflex to direct LDL Fasting    2. Cervical cancer screening  - Pap Screen with HPV - recommended age 30 - 65 years    3. Visit for screening mammogram  - *MA Screening Digital Bilateral; Future    4. Anxiety  Increase to 15mg - let me know about the nausea.  Refill Ativan sent  - LORazepam (ATIVAN) 0.5 MG tablet; Take 1 tablet (0.5 mg) by mouth every 6 " "hours as needed for anxiety  Dispense: 15 tablet; Refill: 0  - escitalopram (LEXAPRO) 10 MG tablet; Take 1.5 tablets (15 mg) by mouth daily for 90 days  Dispense: 135 tablet; Refill: 0    5. Need for hepatitis C screening test  - Hepatitis C Screen Reflex to HCV RNA Quant and Genotype; Future  - Hepatitis C Screen Reflex to HCV RNA Quant and Genotype      Patient has been advised of split billing requirements and indicates understanding: Yes      COUNSELING:  Reviewed preventive health counseling, as reflected in patient instructions      BMI:   Estimated body mass index is 27.61 kg/m  as calculated from the following:    Height as of this encounter: 1.746 m (5' 8.75\").    Weight as of this encounter: 84.2 kg (185 lb 9.6 oz).   Weight management plan: Discussed healthy diet and exercise guidelines      She reports that she has been smoking cigarettes. She has a 1.00 pack-year smoking history. She has never used smokeless tobacco.  Nicotine/Tobacco Cessation Plan:   Information offered: Patient not interested at this time          Ekta Coello MD  [facility]  "

## 2023-02-10 NOTE — NURSING NOTE
"Initial /84   Pulse 77   Temp 97.4  F (36.3  C) (Tympanic)   Ht 1.746 m (5' 8.75\")   Wt 84.2 kg (185 lb 9.6 oz)   LMP 01/09/2023   SpO2 99%   BMI 27.61 kg/m   Estimated body mass index is 27.61 kg/m  as calculated from the following:    Height as of this encounter: 1.746 m (5' 8.75\").    Weight as of this encounter: 84.2 kg (185 lb 9.6 oz). .      "

## 2023-02-11 LAB — HCV AB SERPL QL IA: NONREACTIVE

## 2023-02-15 LAB
BKR LAB AP GYN ADEQUACY: NORMAL
BKR LAB AP GYN INTERPRETATION: NORMAL
BKR LAB AP HPV REFLEX: NORMAL
BKR LAB AP PREVIOUS ABNORMAL: NORMAL
PATH REPORT.COMMENTS IMP SPEC: NORMAL
PATH REPORT.COMMENTS IMP SPEC: NORMAL
PATH REPORT.RELEVANT HX SPEC: NORMAL

## 2023-02-17 LAB
HUMAN PAPILLOMA VIRUS 16 DNA: NEGATIVE
HUMAN PAPILLOMA VIRUS 18 DNA: NEGATIVE
HUMAN PAPILLOMA VIRUS FINAL DIAGNOSIS: NORMAL
HUMAN PAPILLOMA VIRUS OTHER HR: NEGATIVE

## 2023-03-13 DIAGNOSIS — F41.9 ANXIETY: ICD-10-CM

## 2023-03-14 RX ORDER — HYDROXYZINE HYDROCHLORIDE 25 MG/1
TABLET, FILM COATED ORAL
Qty: 30 TABLET | Refills: 1 | Status: SHIPPED | OUTPATIENT
Start: 2023-03-14 | End: 2023-11-17

## 2023-03-14 NOTE — TELEPHONE ENCOUNTER
"Last Written Prescription Date: 11/9/22  Last Fill Quantity: 30, # refills: 1  Last office visit provider: 2/10/23        Requested Prescriptions   Pending Prescriptions Disp Refills     hydrOXYzine (ATARAX) 25 MG tablet [Pharmacy Med Name: HYDROXYZINE HCL 25 MG TABLET] 30 tablet 1     Sig: TAKE 1 TABLET BY MOUTH 3 TIMES DAILY AS NEEDED FOR ANXIETY.       Antihistamines Protocol Passed - 3/13/2023  5:26 PM        Passed - Recent (12 mo) or future (30 days) visit within the authorizing provider's specialty     Patient has had an office visit with the authorizing provider or a provider within the authorizing providers department within the previous 12 mos or has a future within next 30 days. See \"Patient Info\" tab in inbasket, or \"Choose Columns\" in Meds & Orders section of the refill encounter.              Passed - Patient is age 3 or older     Apply age and/or weight-based dosing for peds patients age 3 and older.    Forward request to provider for patients under the age of 3.          Passed - Medication is active on med jovanny Crouch RN 03/14/23 2:42 PM  "

## 2023-07-13 ENCOUNTER — E-VISIT (OUTPATIENT)
Dept: FAMILY MEDICINE | Facility: CLINIC | Age: 43
End: 2023-07-13
Payer: COMMERCIAL

## 2023-07-13 DIAGNOSIS — R11.0 NAUSEA: Primary | ICD-10-CM

## 2023-07-13 PROCEDURE — 99207 PR NON-BILLABLE SERV PER CHARTING: CPT | Performed by: FAMILY MEDICINE

## 2023-08-09 ENCOUNTER — NURSE TRIAGE (OUTPATIENT)
Dept: FAMILY MEDICINE | Facility: CLINIC | Age: 43
End: 2023-08-09
Payer: COMMERCIAL

## 2023-08-09 NOTE — TELEPHONE ENCOUNTER
Reason for Disposition   Sinus congestion (pressure, fullness) present > 10 days    Protocols used: Sinus Pain or Congestion-A-OH    Patient's call transferred to author    Patient reports she has had an ongoing scab to the inside of her nostril since November  Last week, patient developed sinus pressure and a sinus headache - scab is still present     Reporting clear/thin nasal drainage  Denies congestion   States her nose feels very dry     Headache is located to patient's forehead and occasionally radiates to the top of her head   Pain is intermittent  Rates the pain a 6/10 on the pain scale    Denies fevers  Denies nausea/vomiting  Eating and drinking per norm    Appointment scheduled with Dr. Mccormick for tomorrow at 1540  Patient verbalized understanding  No further questions/concerns    Thaddeus Johnston RN

## 2023-08-10 ENCOUNTER — OFFICE VISIT (OUTPATIENT)
Dept: FAMILY MEDICINE | Facility: CLINIC | Age: 43
End: 2023-08-10
Payer: COMMERCIAL

## 2023-08-10 VITALS
SYSTOLIC BLOOD PRESSURE: 136 MMHG | BODY MASS INDEX: 27.55 KG/M2 | HEART RATE: 75 BPM | OXYGEN SATURATION: 99 % | TEMPERATURE: 98.6 F | WEIGHT: 186 LBS | HEIGHT: 69 IN | DIASTOLIC BLOOD PRESSURE: 96 MMHG

## 2023-08-10 DIAGNOSIS — J34.89 INFLAMMATION OF NASAL SEPTUM: Primary | ICD-10-CM

## 2023-08-10 PROCEDURE — 99213 OFFICE O/P EST LOW 20 MIN: CPT | Performed by: FAMILY MEDICINE

## 2023-08-10 RX ORDER — MUPIROCIN 20 MG/G
OINTMENT TOPICAL 3 TIMES DAILY
Qty: 2 G | Refills: 1 | Status: SHIPPED | OUTPATIENT
Start: 2023-08-10 | End: 2023-09-19

## 2023-08-10 NOTE — PATIENT INSTRUCTIONS
Please take either claritin (loratadine) 10 mg , allegra (fexofenadine) 180 mg , or zyrtec (cetirizine) 10mg daily    Aerosol saline nasal spray to soften the dried mucus    Use the ointment 2 times per day for 7 days

## 2023-08-10 NOTE — PROGRESS NOTES
"  Assessment & Plan     Inflammation of nasal septum    - mupirocin (BACTROBAN) 2 % external ointment; Apply topically 3 times daily  - Adult ENT  Referral; Future       BMI:   Estimated body mass index is 27.67 kg/m  as calculated from the following:    Height as of this encounter: 1.746 m (5' 8.75\").    Weight as of this encounter: 84.4 kg (186 lb).       Patient Instructions   Please take either claritin (loratadine) 10 mg , allegra (fexofenadine) 180 mg , or zyrtec (cetirizine) 10mg daily    Aerosol saline nasal spray to soften the dried mucus    Use the ointment 2 times per day for 7 days   If not improving see ent   Marlen Mccormick MD  [facility] SANYA Hernandez is a 42 year old, presenting for the following health issues:  Nasal Problem        8/10/2023     3:53 PM   Additional Questions   Roomed by Nicole CHAO CMA       History of Present Illness       Reason for visit:  Nose scabs wont go away and are hurting and headaches  Symptom onset:  More than a month  Symptoms include:  Nose pain pressure and headaches  Symptom intensity:  Moderate  Symptom progression:  Worsening  Had these symptoms before:  No    She eats 2-3 servings of fruits and vegetables daily.She consumes 0 sweetened beverage(s) daily.She exercises with enough effort to increase her heart rate 20 to 29 minutes per day.  She exercises with enough effort to increase her heart rate 3 or less days per week.   She is taking medications regularly.     She has had sores in her nostrils since November recently getting worse she has not tried anything for it but 3 days of benadryl 1 time   Has sinus pressure and feels like her nose is stuffy    When she has crusting she removed the boogers and they would open again           Review of Systems   Constitutional, HEENT, cardiovascular, pulmonary, gi and gu systems are negative, except as otherwise noted.      Objective    BP (!) 136/96 (BP Location: Right arm, Patient " "Position: Sitting, Cuff Size: Adult Regular)   Pulse 75   Temp 98.6  F (37  C) (Tympanic)   Ht 1.746 m (5' 8.75\")   Wt 84.4 kg (186 lb)   SpO2 99%   BMI 27.67 kg/m    Body mass index is 27.67 kg/m .  Physical Exam   GENERAL: healthy, alert and no distress  EYES: Eyes grossly normal to inspection, PERRL and conjunctivae and sclerae normal  HENT: normal cephalic/atraumatic, right ear: clear effusion, left ear: normal: no effusions, no erythema, normal landmarks, mouth without ulcers or lesions, nasal mucosa edematous , oropharynx clear, oral mucous membranes moist, and inflamed nasasl septum  NECK: no adenopathy, no asymmetry, masses, or scars and thyroid normal to palpation        Marlen Mccormick M.D.              "

## 2023-09-08 ENCOUNTER — HOSPITAL ENCOUNTER (EMERGENCY)
Facility: CLINIC | Age: 43
Discharge: HOME | End: 2023-09-08
Attending: NURSE PRACTITIONER | Admitting: NURSE PRACTITIONER
Payer: COMMERCIAL

## 2023-09-08 VITALS — SYSTOLIC BLOOD PRESSURE: 155 MMHG | OXYGEN SATURATION: 97 % | DIASTOLIC BLOOD PRESSURE: 103 MMHG | HEART RATE: 89 BPM

## 2023-09-08 DIAGNOSIS — R00.2 HEART PALPITATIONS: ICD-10-CM

## 2023-09-08 DIAGNOSIS — R10.13 EPIGASTRIC PAIN: ICD-10-CM

## 2023-09-08 DIAGNOSIS — F41.9 ANXIETY: ICD-10-CM

## 2023-09-08 LAB
ALBUMIN SERPL BCG-MCNC: 5.2 G/DL (ref 3.5–5.2)
ALP SERPL-CCNC: 75 U/L (ref 35–104)
ALT SERPL W P-5'-P-CCNC: 35 U/L (ref 0–50)
ANION GAP SERPL CALCULATED.3IONS-SCNC: 11 MMOL/L (ref 7–15)
AST SERPL W P-5'-P-CCNC: 22 U/L (ref 0–45)
BASOPHILS # BLD AUTO: 0.1 10E3/UL (ref 0–0.2)
BASOPHILS NFR BLD AUTO: 1 %
BILIRUB SERPL-MCNC: 0.4 MG/DL
BUN SERPL-MCNC: 15.8 MG/DL (ref 6–20)
CALCIUM SERPL-MCNC: 10 MG/DL (ref 8.6–10)
CHLORIDE SERPL-SCNC: 99 MMOL/L (ref 98–107)
CREAT SERPL-MCNC: 0.9 MG/DL (ref 0.51–0.95)
DEPRECATED HCO3 PLAS-SCNC: 28 MMOL/L (ref 22–29)
EGFRCR SERPLBLD CKD-EPI 2021: 82 ML/MIN/1.73M2
EOSINOPHIL # BLD AUTO: 0.2 10E3/UL (ref 0–0.7)
EOSINOPHIL NFR BLD AUTO: 2 %
ERYTHROCYTE [DISTWIDTH] IN BLOOD BY AUTOMATED COUNT: 12.5 % (ref 10–15)
GLUCOSE SERPL-MCNC: 97 MG/DL (ref 70–99)
HCT VFR BLD AUTO: 40.9 % (ref 35–47)
HGB BLD-MCNC: 14.1 G/DL (ref 11.7–15.7)
HOLD SPECIMEN: NORMAL
HOLD SPECIMEN: NORMAL
IMM GRANULOCYTES # BLD: 0 10E3/UL
IMM GRANULOCYTES NFR BLD: 0 %
LYMPHOCYTES # BLD AUTO: 2.3 10E3/UL (ref 0.8–5.3)
LYMPHOCYTES NFR BLD AUTO: 26 %
MCH RBC QN AUTO: 31.5 PG (ref 26.5–33)
MCHC RBC AUTO-ENTMCNC: 34.5 G/DL (ref 31.5–36.5)
MCV RBC AUTO: 92 FL (ref 78–100)
MONOCYTES # BLD AUTO: 0.8 10E3/UL (ref 0–1.3)
MONOCYTES NFR BLD AUTO: 9 %
NEUTROPHILS # BLD AUTO: 5.5 10E3/UL (ref 1.6–8.3)
NEUTROPHILS NFR BLD AUTO: 62 %
NRBC # BLD AUTO: 0 10E3/UL
NRBC BLD AUTO-RTO: 0 /100
PLATELET # BLD AUTO: 221 10E3/UL (ref 150–450)
POTASSIUM SERPL-SCNC: 4 MMOL/L (ref 3.4–5.3)
PROT SERPL-MCNC: 7.7 G/DL (ref 6.4–8.3)
RBC # BLD AUTO: 4.47 10E6/UL (ref 3.8–5.2)
SODIUM SERPL-SCNC: 138 MMOL/L (ref 136–145)
TROPONIN T SERPL HS-MCNC: <6 NG/L
WBC # BLD AUTO: 8.8 10E3/UL (ref 4–11)

## 2023-09-08 PROCEDURE — 93010 ELECTROCARDIOGRAM REPORT: CPT | Performed by: EMERGENCY MEDICINE

## 2023-09-08 PROCEDURE — 80053 COMPREHEN METABOLIC PANEL: CPT | Performed by: NURSE PRACTITIONER

## 2023-09-08 PROCEDURE — 99284 EMERGENCY DEPT VISIT MOD MDM: CPT | Mod: 25 | Performed by: NURSE PRACTITIONER

## 2023-09-08 PROCEDURE — 84484 ASSAY OF TROPONIN QUANT: CPT | Performed by: NURSE PRACTITIONER

## 2023-09-08 PROCEDURE — 93005 ELECTROCARDIOGRAM TRACING: CPT | Performed by: NURSE PRACTITIONER

## 2023-09-08 PROCEDURE — 36415 COLL VENOUS BLD VENIPUNCTURE: CPT | Performed by: NURSE PRACTITIONER

## 2023-09-08 PROCEDURE — 85018 HEMOGLOBIN: CPT | Performed by: NURSE PRACTITIONER

## 2023-09-08 PROCEDURE — 99284 EMERGENCY DEPT VISIT MOD MDM: CPT | Performed by: NURSE PRACTITIONER

## 2023-09-08 ASSESSMENT — ACTIVITIES OF DAILY LIVING (ADL): ADLS_ACUITY_SCORE: 35

## 2023-09-08 NOTE — ED PROVIDER NOTES
"  History   No chief complaint on file.    HPI  Mary Ramirez is a 42 year old female who presents the emergency department with a 3 to 4-day history of epigastric pain that radiates to her neck and into her back.  Patient denying altered mental status, chest pain, shortness of breath, difficulty breathing.  Tried prilosec, tums, gas-X without relief. No family hx of colon CA.   No hx of blood clots.  No recent plane rides, car rides, surgical procedures.  Pt is not on hormones.    NOTE from URGENT CARE:    42-year-old female with past medical history significant for anxiety presents to the urgent care with concern over \"heartburn\" which is present for the last 3 to 4 days.  Patient complains of discomfort in her epigastric region and chest which radiates to her neck and into her back.  She has had associated globus sensation in her throat, nausea and sensation that her heart is beating with more force.  She denies any dizziness, lightheadedness, shortness of breath.  She states concern that she had an uncle had MI in his 40s which presented with GI symptoms.  She is a former cigarette user who continues to vape.  Denies any frequent NSAID use.  Reports prior history of heavy EtOH use however has been decreasing over the last several years per her report states a couple drinks per week.  She has attempted to treat with Prilosec and gas-X without relief.  I discussed with patient typical scope evaluation in the urgent care and given patient's concern for potential cardiac etiology of her symptoms recommended transfer to the emergency department.  Patient was amenable to plan.  She was transferred to the ED triage nurse.         Allergies:  Allergies   Allergen Reactions    Morphine And Related Nausea       Problem List:    Patient Active Problem List    Diagnosis Date Noted    Anxiety state 05/05/2014     Priority: Medium    Uses birth control 08/29/2013     Priority: Medium    CARDIOVASCULAR SCREENING; LDL GOAL " LESS THAN 160 10/31/2010     Priority: Medium    Nevus, non-neoplastic 2008     Priority: Medium        Past Medical History:    Past Medical History:   Diagnosis Date    Depressive disorder, not elsewhere classified     Irritable bowel syndrome     Pyelonephritis, unspecified age 14       Past Surgical History:    Past Surgical History:   Procedure Laterality Date    APPENDECTOMY      CHOLECYSTECTOMY, LAPOROSCOPIC      Cholecystectomy, Laparoscopic    FRACTURE TX, WRIST RT/LT  2003    Left wrist, closed reduction       Family History:    Family History   Problem Relation Age of Onset    Thyroid Disease Mother         hypo    Hypertension Father     Breast Cancer Maternal Grandmother     C.A.D. Maternal Grandfather         CABG-in his late 50's    Diabetes Maternal Grandfather     Family History Negative Paternal Grandfather         was killed in MVA    C.A.D. Maternal Uncle         MI at age 43    Diabetes Paternal Aunt     Cerebrovascular Disease No family hx of     Cancer - colorectal No family hx of     Alcohol/Drug No family hx of        Social History:  Marital Status:  Single [1]  Social History     Tobacco Use    Smoking status: Some Days     Packs/day: 0.10     Years: 10.00     Pack years: 1.00     Types: Cigarettes     Last attempt to quit: 2003     Years since quittin.2    Smokeless tobacco: Never   Vaping Use    Vaping Use: Never used   Substance Use Topics    Alcohol use: Yes     Comment: rare    Drug use: No        Medications:    escitalopram (LEXAPRO) 10 MG tablet  hydrOXYzine (ATARAX) 25 MG tablet  LORazepam (ATIVAN) 0.5 MG tablet  mupirocin (BACTROBAN) 2 % external ointment      Review of Systems  As mentioned above in the history present illness. All other systems were reviewed and are negative.    Physical Exam   BP: (!) 155/103  Pulse: 89  SpO2: 97 %      Physical Exam  Vitals and nursing note reviewed.   Constitutional:       General: She is not in acute distress.      Appearance: Normal appearance. She is well-developed. She is not ill-appearing, toxic-appearing or diaphoretic.   HENT:      Head: Normocephalic and atraumatic.      Right Ear: External ear normal.      Left Ear: External ear normal.      Nose: Nose normal.      Mouth/Throat:      Mouth: Mucous membranes are moist.   Eyes:      General:         Right eye: No discharge.         Left eye: No discharge.      Conjunctiva/sclera: Conjunctivae normal.   Cardiovascular:      Rate and Rhythm: Normal rate and regular rhythm.      Heart sounds: Normal heart sounds. No murmur heard.     No friction rub.   Pulmonary:      Effort: Pulmonary effort is normal. No respiratory distress.      Breath sounds: Normal breath sounds. No stridor. No wheezing or rales.   Chest:      Chest wall: No tenderness.   Abdominal:      General: Bowel sounds are normal.      Palpations: Abdomen is soft.   Skin:     General: Skin is warm and dry.      Coloration: Skin is not pale.      Findings: No erythema or rash.   Neurological:      Mental Status: She is alert.   Psychiatric:         Mood and Affect: Mood is anxious.         Speech: Speech normal.         Behavior: Behavior is cooperative.         ED Course              ED Course as of 09/08/23 1909   Fri Sep 08, 2023   1847 EKG independently interpreted and evaluated by Dr. Hever Ordoñez at 6:30 PM and reviewed as sinus rhythm.  No ectopy is seen no acute ST segment elevation.  RSR noted in V1 and V2.     Procedures    Results for orders placed or performed during the hospital encounter of 09/08/23 (from the past 24 hour(s))   CBC with platelets, differential    Narrative    The following orders were created for panel order CBC with platelets, differential.  Procedure                               Abnormality         Status                     ---------                               -----------         ------                     CBC with platelets and d...[[id number]]                      Final  result                 Please view results for these tests on the individual orders.   Comprehensive metabolic panel   Result Value Ref Range    Sodium 138 136 - 145 mmol/L    Potassium 4.0 3.4 - 5.3 mmol/L    Chloride 99 98 - 107 mmol/L    Carbon Dioxide (CO2) 28 22 - 29 mmol/L    Anion Gap 11 7 - 15 mmol/L    Urea Nitrogen 15.8 6.0 - 20.0 mg/dL    Creatinine 0.90 0.51 - 0.95 mg/dL    Calcium 10.0 8.6 - 10.0 mg/dL    Glucose 97 70 - 99 mg/dL    Alkaline Phosphatase 75 35 - 104 U/L    AST 22 0 - 45 U/L    ALT 35 0 - 50 U/L    Protein Total 7.7 6.4 - 8.3 g/dL    Albumin 5.2 3.5 - 5.2 g/dL    Bilirubin Total 0.4 <=1.2 mg/dL    GFR Estimate 82 >60 mL/min/1.73m2   Troponin T, High Sensitivity   Result Value Ref Range    Troponin T, High Sensitivity <6 <=14 ng/L   [place] Draw    Narrative    The following orders were created for panel order [place] Draw.  Procedure                               Abnormality         Status                     ---------                               -----------         ------                     Extra Blue Top Tube[[id number]]                              Final result               Extra Red Top Tube[[id number]]                               Final result                 Please view results for these tests on the individual orders.   CBC with platelets and differential   Result Value Ref Range    WBC Count 8.8 4.0 - 11.0 10e3/uL    RBC Count 4.47 3.80 - 5.20 10e6/uL    Hemoglobin 14.1 11.7 - 15.7 g/dL    Hematocrit 40.9 35.0 - 47.0 %    MCV 92 78 - 100 fL    MCH 31.5 26.5 - 33.0 pg    MCHC 34.5 31.5 - 36.5 g/dL    RDW 12.5 10.0 - 15.0 %    Platelet Count 221 150 - 450 10e3/uL    % Neutrophils 62 %    % Lymphocytes 26 %    % Monocytes 9 %    % Eosinophils 2 %    % Basophils 1 %    % Immature Granulocytes 0 %    NRBCs per 100 WBC 0 <1 /100    Absolute Neutrophils 5.5 1.6 - 8.3 10e3/uL    Absolute Lymphocytes 2.3 0.8 - 5.3 10e3/uL    Absolute Monocytes 0.8 0.0 - 1.3 10e3/uL    Absolute  Eosinophils 0.2 0.0 - 0.7 10e3/uL    Absolute Basophils 0.1 0.0 - 0.2 10e3/uL    Absolute Immature Granulocytes 0.0 <=0.4 10e3/uL    Absolute NRBCs 0.0 10e3/uL   Extra Blue Top Tube   Result Value Ref Range    Hold Specimen JIC    Extra Red Top Tube   Result Value Ref Range    Hold Specimen JIC        Medications - No data to display    Assessments & Plan (with Medical Decision Making)     I have reviewed the nursing notes.    I have reviewed the findings, diagnosis, plan and need for follow up with the patient.  42-year-old female with past medical history of anxiety presents the emergency department with a 3 to 4-day history of epigastric pain, heartburn.  Patient reporting that her uncle  of a heart attack with similar symptoms and requesting evaluation for heart attack.  Patient has no history of PE, DVT and no risk factors for this at this point in time.  D-dimer not obtained due to lack of risk factors.  EKG reveals no acute ST segment elevation or depression and troponin is obtained and is negative and given that symptoms have been ongoing no need to repeat.  Discussed with patient possibility for gastrointestinal etiology, anxiety, cardiac.  Will place order for Zio patch monitor and recommend discussing stress testing with primary care provider and patient has current hydroxyzine and encouraged that she can use this.  Also advised on use of fresh fiber foods such as pears, apricots, prunes as well for healthy lifestyle.  Patient verbalized understanding regarding all of the above and was given the following instructions below.    Patient given the following instructions:  I have placed an order for a Zio patch monitor.  Please contact outpatient heart clinic if they have not called you by this next Wednesday to schedule a time to get the monitor applied.  Today in the emergency room, your labs show no indication of a heart attack.  I recommend trying hydroxyzine that you have at home up to 3 times daily  to see if this helps with your symptoms.  I also recommend eating 1 cup of prunes, apricots, pears daily to see if this will help with your symptoms.  I recommend following up with your primary care provider and discuss with them further evaluation to rule out any heart disease.  They can order tests such as a stress test or the most appropriate test for you.  Please return to the emergency room if you have severe onset of chest pain or shortness of breath or difficulty breathing.    New Prescriptions    No medications on file       Final diagnoses:   Epigastric pain   Anxiety   Heart palpitations       9/8/2023   [facility] EMERGENCY DEPT       Юлия Agrawal APRN CNP  09/08/23 0435

## 2023-09-08 NOTE — ED NOTES
"42-year-old female with past medical history significant for anxiety presents to the urgent care with concern over \"heartburn\" which is present for the last 3 to 4 days.  Patient complains of discomfort in her epigastric region and chest which radiates to her neck and into her back.  She has had associated globus sensation in her throat, nausea and sensation that her heart is beating with more force.  She denies any dizziness, lightheadedness, shortness of breath.  She states concern that she had an uncle had MI in his 40s which presented with GI symptoms.  She is a former cigarette user who continues to vape.  Denies any frequent NSAID use.  Reports prior history of heavy EtOH use however has been decreasing over the last several years per her report states a couple drinks per week.  She has attempted to treat with Prilosec and gas-X without relief.  I discussed with patient typical scope evaluation in the urgent care and given patient's concern for potential cardiac etiology of her symptoms recommended transfer to the emergency department.  Patient was amenable to plan.  She was transferred to the ED triage nurse.      Disclaimer: This note consists of symbols derived from keyboarding, dictation, and/or voice recognition software. As a result, there may be errors in the script that have gone undetected.  Please consider this when interpreting information found in the chart.       Radha Messer PA-C  09/08/23 1617    "

## 2023-09-08 NOTE — ED TRIAGE NOTES
Symptoms since this past Tuesday.     Triage Assessment       Row Name 09/08/23 8277       Triage Assessment (Adult)    Airway WDL WDL       Respiratory WDL    Respiratory WDL WDL       Skin Circulation/Temperature WDL    Skin Circulation/Temperature WDL WDL       Cardiac WDL    Cardiac WDL WDL       Peripheral/Neurovascular WDL    Peripheral Neurovascular WDL WDL       Cognitive/Neuro/Behavioral WDL    Cognitive/Neuro/Behavioral WDL WDL

## 2023-09-09 NOTE — DISCHARGE INSTRUCTIONS
I have placed an order for a Zio patch monitor.  Please contact outpatient heart clinic if they have not called you by this next Wednesday to schedule a time to get the monitor applied.  Today in the emergency room, your labs show no indication of a heart attack.  I recommend trying hydroxyzine that you have at home up to 3 times daily to see if this helps with your symptoms.  I also recommend eating 1 cup of prunes, apricots, pears daily to see if this will help with your symptoms.  I recommend following up with your primary care provider and discuss with them further evaluation to rule out any heart disease.  They can order tests such as a stress test or the most appropriate test for you.  Please return to the emergency room if you have severe onset of chest pain or shortness of breath or difficulty breathing.

## 2023-09-12 ENCOUNTER — HOSPITAL ENCOUNTER (OUTPATIENT)
Dept: CARDIOLOGY | Facility: CLINIC | Age: 43
Discharge: HOME | End: 2023-09-12
Attending: NURSE PRACTITIONER | Admitting: NURSE PRACTITIONER
Payer: COMMERCIAL

## 2023-09-12 DIAGNOSIS — R00.2 HEART PALPITATIONS: ICD-10-CM

## 2023-09-12 PROCEDURE — 93244 EXT ECG>48HR<7D REV&INTERPJ: CPT | Performed by: INTERNAL MEDICINE

## 2023-09-12 PROCEDURE — 93242 EXT ECG>48HR<7D RECORDING: CPT

## 2023-09-19 ENCOUNTER — OFFICE VISIT (OUTPATIENT)
Dept: FAMILY MEDICINE | Facility: CLINIC | Age: 43
End: 2023-09-19
Payer: COMMERCIAL

## 2023-09-19 VITALS
RESPIRATION RATE: 16 BRPM | SYSTOLIC BLOOD PRESSURE: 122 MMHG | BODY MASS INDEX: 27.72 KG/M2 | OXYGEN SATURATION: 98 % | HEART RATE: 90 BPM | HEIGHT: 69 IN | TEMPERATURE: 97.5 F | WEIGHT: 187.13 LBS | DIASTOLIC BLOOD PRESSURE: 80 MMHG

## 2023-09-19 DIAGNOSIS — R41.840 ATTENTION DEFICIT: ICD-10-CM

## 2023-09-19 DIAGNOSIS — R19.7 DIARRHEA, UNSPECIFIED TYPE: ICD-10-CM

## 2023-09-19 DIAGNOSIS — F41.9 ANXIETY: Primary | ICD-10-CM

## 2023-09-19 DIAGNOSIS — R00.2 PALPITATIONS: ICD-10-CM

## 2023-09-19 DIAGNOSIS — Z13.220 SCREENING FOR HYPERLIPIDEMIA: ICD-10-CM

## 2023-09-19 DIAGNOSIS — K90.49 FOOD INTOLERANCE IN ADULT: ICD-10-CM

## 2023-09-19 LAB
CHOLEST SERPL-MCNC: 216 MG/DL
HDLC SERPL-MCNC: 58 MG/DL
LDLC SERPL CALC-MCNC: 128 MG/DL
NONHDLC SERPL-MCNC: 158 MG/DL
TRIGL SERPL-MCNC: 150 MG/DL

## 2023-09-19 PROCEDURE — 90715 TDAP VACCINE 7 YRS/> IM: CPT | Performed by: FAMILY MEDICINE

## 2023-09-19 PROCEDURE — 90471 IMMUNIZATION ADMIN: CPT | Performed by: FAMILY MEDICINE

## 2023-09-19 PROCEDURE — 36415 COLL VENOUS BLD VENIPUNCTURE: CPT | Performed by: FAMILY MEDICINE

## 2023-09-19 PROCEDURE — 80061 LIPID PANEL: CPT | Performed by: FAMILY MEDICINE

## 2023-09-19 PROCEDURE — 99214 OFFICE O/P EST MOD 30 MIN: CPT | Mod: 25 | Performed by: FAMILY MEDICINE

## 2023-09-19 PROCEDURE — 86364 TISS TRNSGLTMNASE EA IG CLAS: CPT | Performed by: FAMILY MEDICINE

## 2023-09-19 PROCEDURE — 90472 IMMUNIZATION ADMIN EACH ADD: CPT | Performed by: FAMILY MEDICINE

## 2023-09-19 PROCEDURE — 90686 IIV4 VACC NO PRSV 0.5 ML IM: CPT | Performed by: FAMILY MEDICINE

## 2023-09-19 RX ORDER — LORAZEPAM 0.5 MG/1
0.5 TABLET ORAL EVERY 6 HOURS PRN
Qty: 15 TABLET | Refills: 0 | Status: SHIPPED | OUTPATIENT
Start: 2023-09-19 | End: 2023-11-17

## 2023-09-19 NOTE — PROGRESS NOTES
Assessment/Plan:    Mary Ramirez is a 42 year old female presenting for:    Anxiety  She is doing extremely well with the Lexapro.  She does not need refills of this at this point.  Refill of Ativan sent to the pharmacy which she uses once or twice per month.  - LORazepam (ATIVAN) 0.5 MG tablet  Dispense: 15 tablet; Refill: 0    Food intolerance in adult  Referral to gastroenterology.  Family history of celiac and celiac blood panel ordered.  - Tissue transglutaminase emily IgA and IgG  - Adult GI  Referral - Consult Only  - Tissue transglutaminase emily IgA and IgG    Diarrhea, unspecified type  - Tissue transglutaminase emily IgA and IgG  - Adult GI  Referral - Consult Only  - Tissue transglutaminase emily IgA and IgG    Attention deficit  Referral for ADHD evaluation.  If she is diagnosed with ADHD she will contact me and we can do a video visit to discuss medication  - Adult Mental Health  Referral    Screening for hyperlipidemia  - Lipid panel reflex to direct LDL Non-fasting  - Lipid panel reflex to direct LDL Non-fasting    Palpitations  Currently wearing a Zio patch monitor.  Will let me know when this is read and I can give her my interpretation as well.      Medications Discontinued During This Encounter   Medication Reason    LORazepam (ATIVAN) 0.5 MG tablet Reorder (No AVS)           Chief Complaint:  Recheck Medication, Referral, Follow Up, and ER F/U        Subjective:   Mary Ramirez is a very pleasant 42-year-old female with a past medical history significant for anxiety who presents to the clinic today for a multitude of concerns.    1.  Anxiety: Patient is currently on Lexapro for anxiety.  She is doing very well with the medication.  She is on 15 mg daily.  No specific questions or concerns.    2.  Food intolerance: Patient notes that she will often have some diarrhea.  She cannot really figure out what foods exacerbate this.  Her mother has a history of celiac.  She will  "often have episodes of diarrhea that she cannot figure out the cause.    3.  Attention deficit: Patient notes that she has always had issues with attention.  She states that she thought this was due to her anxiety and depression but now that is well controlled with the Lexapro she continues to have issues.    #4.  Palpitations: Patient was recently seen in the emergency department.  This emergency department visit was reviewed.  She was having chest pain.  Troponin and EKG were negative.  She also complained of some heart palpitation so they set her up with a Zio patch monitor.  Is her last day of using this today.  Palpitations are noted to be just occasional and often at rest.  No chest pain or shortness of breath or lightheadedness with these.    12 point review of systems completed and negative except for what has been described above    History   Smoking Status    Some Days    Packs/day: 0.10    Years: 10.00    Types: Cigarettes    Last attempt to quit: 6/29/2003   Smokeless Tobacco    Never         Current Outpatient Medications:     escitalopram (LEXAPRO) 10 MG tablet, Take 1.5 tablets (15 mg) by mouth daily, Disp: 135 tablet, Rfl: 2    hydrOXYzine (ATARAX) 25 MG tablet, TAKE 1 TABLET BY MOUTH 3 TIMES DAILY AS NEEDED FOR ANXIETY., Disp: 30 tablet, Rfl: 1    LORazepam (ATIVAN) 0.5 MG tablet, Take 1 tablet (0.5 mg) by mouth every 6 hours as needed for anxiety, Disp: 15 tablet, Rfl: 0    mupirocin (BACTROBAN) 2 % external ointment, Apply topically 3 times daily (Patient not taking: Reported on 9/19/2023), Disp: 2 g, Rfl: 1      Objective:  Vitals:    09/19/23 0905   BP: 122/80   Pulse: 90   Resp: 16   Temp: 97.5  F (36.4  C)   TempSrc: Tympanic   SpO2: 98%   Weight: 84.9 kg (187 lb 2 oz)   Height: 1.746 m (5' 8.75\")       Body mass index is 27.83 kg/m .    Vital signs reviewed and stable  General: No acute distress  Psych: Appropriate affect  HEENT: moist mucous membranes, pupils equal, round, reactive to light " and accomodation, tympanic membranes are pearly grey bilaterally  Lymph: no cervical or supraclavicular lymphadenopathy  Cardiovascular: regular rate and rhythm with no murmur  Pulmonary: clear to auscultation bilaterally with no wheeze  Abdomen: soft, non tender, non distended with normo-active bowel sounds  Extremities: warm and well perfused with no edema  Skin: warm and dry with no rash         This note has been dictated and transcribed using voice recognition software.   Any errors in transcription are unintentional and inherent to the software.

## 2023-09-19 NOTE — PATIENT INSTRUCTIONS
[facility] [place] [facility]  [address] [place] [address]  [facility]    [phone]

## 2023-09-20 LAB
TTG IGA SER-ACNC: 0.3 U/ML
TTG IGG SER-ACNC: <0.6 U/ML

## 2023-09-25 ENCOUNTER — TRANSFERRED RECORDS (OUTPATIENT)
Dept: HEALTH INFORMATION MANAGEMENT | Facility: CLINIC | Age: 43
End: 2023-09-25
Payer: COMMERCIAL

## 2023-10-20 ENCOUNTER — IMMUNIZATION (OUTPATIENT)
Dept: FAMILY MEDICINE | Facility: CLINIC | Age: 43
End: 2023-10-20
Payer: COMMERCIAL

## 2023-10-20 PROCEDURE — 91320 SARSCV2 VAC 30MCG TRS-SUC IM: CPT

## 2023-10-20 PROCEDURE — 90480 ADMN SARSCOV2 VAC 1/ONLY CMP: CPT

## 2023-10-23 ENCOUNTER — TELEPHONE (OUTPATIENT)
Dept: FAMILY MEDICINE | Facility: CLINIC | Age: 43
End: 2023-10-23
Payer: COMMERCIAL

## 2023-10-23 NOTE — TELEPHONE ENCOUNTER
Reason for Call:  Appointment Request    Patient requesting this type of appt:  Office Visit     Requested provider:  any provider     Reason patient unable to be scheduled: Not within requested timeframe    When does patient want to be seen/preferred time: Same day    Comments: Have ear pain may have ear infection, she did test positive covid yesterday and wanted to know if she can get a virtual visit if possible. She would like to be seen today or tomorrow at the [place], [place] or [place] location if have to be seen in person     Could we send this information to you in [employer] or would you prefer to receive a phone call?:   Patient would prefer a phone call   Okay to leave a detailed message?: Yes at Cell number on file:    Telephone Information:   Mobile [phone]       Call taken on 10/23/2023 at 9:12 AM by Mima Artis

## 2023-10-23 NOTE — TELEPHONE ENCOUNTER
Call placed to patient   Relayed that the Clinic does not have a negative pressure room and that UC evaluation would be appropriate as patient tested positive for Covid-19     Patient states Covid-19 symptoms are mostly head cold related and denies respiratory distress symptoms     Patient will go to the [facility] for evaluation of ear pain     Thaddeus Johnston RN

## 2023-10-24 ENCOUNTER — HOSPITAL ENCOUNTER (EMERGENCY)
Facility: CLINIC | Age: 43
Discharge: HOME | End: 2023-10-24
Attending: PHYSICIAN ASSISTANT | Admitting: PHYSICIAN ASSISTANT
Payer: COMMERCIAL

## 2023-10-24 VITALS
DIASTOLIC BLOOD PRESSURE: 98 MMHG | OXYGEN SATURATION: 97 % | HEART RATE: 87 BPM | SYSTOLIC BLOOD PRESSURE: 154 MMHG | TEMPERATURE: 97.3 F | RESPIRATION RATE: 16 BRPM

## 2023-10-24 DIAGNOSIS — H92.02 OTALGIA, LEFT: ICD-10-CM

## 2023-10-24 DIAGNOSIS — U07.1 COVID-19 VIRUS INFECTION: ICD-10-CM

## 2023-10-24 PROCEDURE — 99213 OFFICE O/P EST LOW 20 MIN: CPT | Performed by: PHYSICIAN ASSISTANT

## 2023-10-24 PROCEDURE — G0463 HOSPITAL OUTPT CLINIC VISIT: HCPCS

## 2023-10-24 ASSESSMENT — ACTIVITIES OF DAILY LIVING (ADL): ADLS_ACUITY_SCORE: 35

## 2023-10-24 NOTE — ED PROVIDER NOTES
History   No chief complaint on file.    HPI  Mary Ramirez is a 43 year old female who complains of left ear pain which has been present for the last 4 days.  Patient additionally complains of chills, sweats, myalgias, fatigue, sore throat, nasal congestion, cough and diarrhea.  She tested positive for COVID-19 with home test two days ago. She states that her systemic symptoms seem to be overall improving however her ear pain has persisted.  She denies any objective fever, dyspnea, wheezing, nausea, vomiting, abdominal pain.  She has attempted to treat with DayQuil.  She has been vaccinated for COVID-19 and actually received booster last week.      Allergies:  Allergies   Allergen Reactions    Morphine And Related Nausea       Problem List:    Patient Active Problem List    Diagnosis Date Noted    Anxiety state 05/05/2014     Priority: Medium    Uses birth control 08/29/2013     Priority: Medium    CARDIOVASCULAR SCREENING; LDL GOAL LESS THAN 160 10/31/2010     Priority: Medium    Nevus, non-neoplastic 07/02/2008     Priority: Medium        Past Medical History:    Past Medical History:   Diagnosis Date    Depressive disorder, not elsewhere classified     Irritable bowel syndrome     Pyelonephritis, unspecified age 14       Past Surgical History:    Past Surgical History:   Procedure Laterality Date    APPENDECTOMY      CHOLECYSTECTOMY, LAPOROSCOPIC      Cholecystectomy, Laparoscopic    FRACTURE TX, WRIST RT/LT  11/2003    Left wrist, closed reduction       Family History:    Family History   Problem Relation Age of Onset    Thyroid Disease Mother         hypo    Hypertension Father     Breast Cancer Maternal Grandmother     C.A.D. Maternal Grandfather         CABG-in his late 50's    Diabetes Maternal Grandfather     Family History Negative Paternal Grandfather         was killed in MVA    C.A.D. Maternal Uncle         MI at age 43    Diabetes Paternal Aunt     Cerebrovascular Disease No family hx of      Cancer - colorectal No family hx of     Alcohol/Drug No family hx of        Social History:  Marital Status:  Single [1]  Social History     Tobacco Use    Smoking status: Some Days     Packs/day: 0.10     Years: 10.00     Additional pack years: 0.00     Total pack years: 1.00     Types: Cigarettes     Last attempt to quit: 2003     Years since quittin.3    Smokeless tobacco: Never   Vaping Use    Vaping Use: Never used   Substance Use Topics    Alcohol use: Yes     Comment: rare    Drug use: No        Medications:    escitalopram (LEXAPRO) 10 MG tablet  hydrOXYzine (ATARAX) 25 MG tablet  LORazepam (ATIVAN) 0.5 MG tablet      Review of Systems  CONSTITUTIONAL:POSITIVE  for improved chills, sweats, fatigue,  INTEGUMENTARY/SKIN: NEGATIVE for worrisome rashes, moles or lesions  EYES: NEGATIVE for vision changes or irritation  ENT/MOUTH: POSITIVE for sore throat, nasal congestion, left ear pain   RESP:POSITIVE for cough and NEGATIVE for dyspnea, wheezing   GI:POSITIVE for diarrhea  NEGATIVE for nausea, vomiting, abdominal pain   Physical Exam   BP: (!) 154/98  Pulse: 87  Temp: 97.3  F (36.3  C)  Resp: 16  SpO2: 97 %  Physical Exam  The right TM is normal: no effusions, no erythema, and normal landmarks     The right auditory canal is normal and without drainage, edema or erythema  The left TM is normal, no erythema, normal landmarks   The left auditory canal is normal and without drainage, edema or erythema  Oropharynx exam is normal: no lesions, erythema, adenopathy or exudate.  GENERAL: no acute distress  EYES: EOMI,  PERRL, conjunctiva clear  NECK: supple, non-tender to palpation, no adenopathy noted  RESP: lungs clear to auscultation - no rales, rhonchi or wheezes  CV: regular rates and rhythm, normal S1 S2, no murmur noted  SKIN: no suspicious lesions or rashes   ED Course           Procedures         Critical Care time:  none        No results found for this or any previous visit (from the past 24  hour(s)).    Medications - No data to display    Assessments & Plan (with Medical Decision Making)     I have reviewed the nursing notes.    I have reviewed the findings, diagnosis, plan and need for follow up with the patient.       Discharge Medication List as of 10/24/2023  1:36 PM        Final diagnoses:   COVID-19 virus infection   Otalgia, left     43-year-old female recently diagnosed with COVID-19 from home test presents to urgent care with concern over left ear pain.  Physical exam findings did not appear consistent with purulent otitis media, otitis externa.  Suspect eustachian tube dysfunction.  Do not suspect TMJ.  She was discharged home with instructions for symptomatic treatment with OTC antihistamine, steroid nasal spray.  Discussed continued isolation recommendations.  Follow up with PCP if no improvement of ear pain in 5-7 days. Worrisome reasons to return to ER/UC sooner discussed.     Disclaimer: This note consists of symbols derived from keyboarding, dictation, and/or voice recognition software. As a result, there may be errors in the script that have gone undetected.  Please consider this when interpreting information found in the chart.       10/24/2023   [facility] EMERGENCY DEPT       Radha Messer PA-C  10/29/23 [id number]

## 2023-11-16 ENCOUNTER — OFFICE VISIT (OUTPATIENT)
Dept: FAMILY MEDICINE | Facility: CLINIC | Age: 43
End: 2023-11-16
Payer: COMMERCIAL

## 2023-11-16 VITALS
SYSTOLIC BLOOD PRESSURE: 122 MMHG | WEIGHT: 192 LBS | BODY MASS INDEX: 28.56 KG/M2 | TEMPERATURE: 98.3 F | HEART RATE: 83 BPM | DIASTOLIC BLOOD PRESSURE: 80 MMHG | OXYGEN SATURATION: 98 %

## 2023-11-16 DIAGNOSIS — Z86.79 HISTORY OF SUBARACHNOID HEMORRHAGE: Primary | ICD-10-CM

## 2023-11-16 DIAGNOSIS — T14.8XXA LOCAL INFECTION OF WOUND: ICD-10-CM

## 2023-11-16 DIAGNOSIS — S00.512D: ICD-10-CM

## 2023-11-16 DIAGNOSIS — L08.9 LOCAL INFECTION OF WOUND: ICD-10-CM

## 2023-11-16 PROCEDURE — 99213 OFFICE O/P EST LOW 20 MIN: CPT | Performed by: FAMILY MEDICINE

## 2023-11-16 RX ORDER — LIDOCAINE HYDROCHLORIDE 20 MG/ML
15 SOLUTION OROPHARYNGEAL EVERY 4 HOURS PRN
Qty: 20 ML | Refills: 1 | Status: SHIPPED | OUTPATIENT
Start: 2023-11-16 | End: 2023-11-28

## 2023-11-16 NOTE — PROGRESS NOTES
"\  Assessment & Plan     History of subarachnoid hemorrhage  Referral to neurological for follow up   - Neurosurgery Referral; Future    Local infection of wound  Will start this stitches on chin   - amoxicillin-clavulanate (AUGMENTIN) 875-125 MG tablet; Take 1 tablet by mouth 2 times daily for 10 days    Abrasion of oral cavity, subsequent encounter  For pain healing well   - lidocaine, viscous, (XYLOCAINE) 2 % solution; Swish and spit 15 mLs in mouth every 4 hours as needed for pain ; Max 8 doses/24 hour period. Apply to wound up to             BMI:   Estimated body mass index is 28.56 kg/m  as calculated from the following:    Height as of 9/19/23: 1.746 m (5' 8.75\").    Weight as of this encounter: 87.1 kg (192 lb).       CONSULTATION/REFERRAL to neurosurgery     Marlen Mccormick MD  [facility] SANYA Hernandez is a 43 year old, presenting for the following health issues:  Infection        11/16/2023    10:47 AM   Additional Questions   Roomed by Nicole CHAO CMA       History of Present Illness       Reason for visit:  Possible stitches infection  Symptom onset:  Today  Symptoms include:  Leaking redness hard sore  Symptom intensity:  Mild  Symptom progression:  Worsening  Had these symptoms before:  No  What makes it worse:  No  What makes it better:  No    She eats 2-3 servings of fruits and vegetables daily.She consumes 0 sweetened beverage(s) daily.She exercises with enough effort to increase her heart rate 9 or less minutes per day.  She exercises with enough effort to increase her heart rate 3 or less days per week.   She is taking medications regularly.         No headache no ringing in the ears the stitches on the chin it  was weeping clear but drainage increased and redness occurred yesterday and it hurts a lot today       Review of Systems   Negative       Objective    /80 (BP Location: Right arm, Patient Position: Sitting, Cuff Size: Adult Regular)   Pulse 83   Temp " 98.3  F (36.8  C) (Tympanic)   Wt 87.1 kg (192 lb)   LMP  (LMP Unknown)   SpO2 98%   BMI 28.56 kg/m    Body mass index is 28.56 kg/m .  Physical Exam   GENERAL: healthy, alert and no distress  Lower inner lip with abrasion   SKIN: stitches right chin with marked erythema and tenderness   NEURO: Normal strength and tone, mentation intact and speech normal        Marlen Mccormick M.D.

## 2023-11-17 ENCOUNTER — MYC REFILL (OUTPATIENT)
Dept: FAMILY MEDICINE | Facility: CLINIC | Age: 43
End: 2023-11-17
Payer: COMMERCIAL

## 2023-11-17 DIAGNOSIS — F41.9 ANXIETY: ICD-10-CM

## 2023-11-17 RX ORDER — HYDROXYZINE HYDROCHLORIDE 25 MG/1
25 TABLET, FILM COATED ORAL 3 TIMES DAILY PRN
Qty: 30 TABLET | Refills: 1 | Status: SHIPPED | OUTPATIENT
Start: 2023-11-17 | End: 2024-01-12

## 2023-11-17 RX ORDER — LORAZEPAM 0.5 MG/1
0.5 TABLET ORAL EVERY 6 HOURS PRN
Qty: 15 TABLET | Refills: 0 | Status: SHIPPED | OUTPATIENT
Start: 2023-11-17 | End: 2024-01-12

## 2023-11-28 ENCOUNTER — OFFICE VISIT (OUTPATIENT)
Dept: FAMILY MEDICINE | Facility: CLINIC | Age: 43
End: 2023-11-28
Payer: COMMERCIAL

## 2023-11-28 VITALS
HEIGHT: 69 IN | OXYGEN SATURATION: 100 % | SYSTOLIC BLOOD PRESSURE: 141 MMHG | WEIGHT: 197 LBS | TEMPERATURE: 98.9 F | BODY MASS INDEX: 29.18 KG/M2 | HEART RATE: 88 BPM | RESPIRATION RATE: 18 BRPM | DIASTOLIC BLOOD PRESSURE: 94 MMHG

## 2023-11-28 DIAGNOSIS — Z48.02 VISIT FOR SUTURE REMOVAL: Primary | ICD-10-CM

## 2023-11-28 PROCEDURE — 99212 OFFICE O/P EST SF 10 MIN: CPT | Performed by: FAMILY MEDICINE

## 2023-11-28 NOTE — ASSESSMENT & PLAN NOTE
Suture removal  Chart is reviewed.  The patient had sutures placed on November 10, 2023 after she fell due to intoxication.  The patient states she is no longer using substances and has stopped drinking.  She said after she had a brain bleed due to falling it really made her reevaluate whether she wanted to be using substances.    At this time on chart review I can see that there were 3 subcutaneous sutures which have likely dissolved.  In addition there are 9 interrupted Prolene sutures based on documentation and which are visible today.     The wound is well healed without signs of infection.  The sutures are removed without complication and the patient tolerated this extremely well. Return prn.

## 2023-11-28 NOTE — PROGRESS NOTES
"  Assessment & Plan   Problem List Items Addressed This Visit          Other    Visit for suture removal - Primary     Suture removal  Chart is reviewed.  The patient had sutures placed on November 10, 2023 after she fell due to intoxication.  The patient states she is no longer using substances and has stopped drinking.  She said after she had a brain bleed due to falling it really made her reevaluate whether she wanted to be using substances.    At this time on chart review I can see that there were 3 subcutaneous sutures which have likely dissolved.  In addition there are 9 interrupted Prolene sutures based on documentation and which are visible today.     The wound is well healed without signs of infection.  The sutures are removed without complication and the patient tolerated this extremely well. Return prn.                Garima Hameed MD  [facility]    Ximena Hernandez is a 43 year old, presenting for the following health issues:  Suture Removal (Chin, 9 stiches and put in the the 11/10/)      Suture Removal               Objective    BP (!) 141/94 (BP Location: Left arm, Patient Position: Left side, Cuff Size: Adult Large)   Pulse 88   Temp 98.9  F (37.2  C) (Oral)   Resp 18   Ht 1.746 m (5' 8.75\")   Wt 89.4 kg (197 lb)   LMP 10/31/2023 (Approximate)   SpO2 100%   BMI 29.30 kg/m    Body mass index is 29.3 kg/m .  Physical Exam  Constitutional:       Appearance: Normal appearance.   HENT:      Head: Normocephalic and atraumatic.        Comments: Well-healed right chin laceration.  Sutures removed in the usual manner without complication.  Cardiovascular:      Rate and Rhythm: Normal rate and regular rhythm.   Pulmonary:      Effort: Pulmonary effort is normal.   Musculoskeletal:         General: Normal range of motion.      Cervical back: Normal range of motion and neck supple.   Neurological:      General: No focal deficit present.      Mental Status: She is alert and " oriented to person, place, and time.

## 2023-12-06 ENCOUNTER — TRANSFERRED RECORDS (OUTPATIENT)
Dept: HEALTH INFORMATION MANAGEMENT | Facility: CLINIC | Age: 43
End: 2023-12-06
Payer: COMMERCIAL

## 2023-12-11 NOTE — TELEPHONE ENCOUNTER
RECORDS RECEIVED FROM: Internal/External   REFERRED BY: Marlen Mccormick MD   REASON FOR VISIT: History of subarachnoid hemorrhage   Date of Appt: 12/13/23   NOTES (FOR ALL VISITS) STATUS DETAILS   OFFICE NOTE from referring provider Internal 11/16/23   OFFICE NOTE from other specialist N/A    DISCHARGE SUMMARY from hospital N/A    DISCHARGE REPORT from the ER Care Everywhere 11/10/23 [facility]   SURGERY N/A    PHYSICAL THERAPY N/A    INJECTIONS N/A    EMG N/A    IMAGING  (FOR ALL VISITS)     XR (HEAD, NECK, SPINE) N/A    MRI (HEAD, NECK, SPINE) N/A    CT (HEAD, NECK, SPINE) In process **Head, cervical, and maxillofacial requested from [facility]**

## 2023-12-13 ENCOUNTER — HOSPITAL ENCOUNTER (OUTPATIENT)
Dept: CT IMAGING | Facility: HOSPITAL | Age: 43
Discharge: HOME | End: 2023-12-13
Attending: PHYSICIAN ASSISTANT | Admitting: PHYSICIAN ASSISTANT
Payer: COMMERCIAL

## 2023-12-13 ENCOUNTER — PRE VISIT (OUTPATIENT)
Dept: NEUROSURGERY | Facility: CLINIC | Age: 43
End: 2023-12-13

## 2023-12-13 ENCOUNTER — OFFICE VISIT (OUTPATIENT)
Dept: NEUROSURGERY | Facility: CLINIC | Age: 43
End: 2023-12-13
Attending: FAMILY MEDICINE
Payer: COMMERCIAL

## 2023-12-13 VITALS
WEIGHT: 191 LBS | BODY MASS INDEX: 28.29 KG/M2 | HEART RATE: 96 BPM | DIASTOLIC BLOOD PRESSURE: 94 MMHG | OXYGEN SATURATION: 98 % | SYSTOLIC BLOOD PRESSURE: 169 MMHG | HEIGHT: 69 IN

## 2023-12-13 DIAGNOSIS — Z86.79 HISTORY OF SUBARACHNOID HEMORRHAGE: ICD-10-CM

## 2023-12-13 PROCEDURE — 70450 CT HEAD/BRAIN W/O DYE: CPT

## 2023-12-13 PROCEDURE — 99203 OFFICE O/P NEW LOW 30 MIN: CPT | Performed by: PHYSICIAN ASSISTANT

## 2023-12-13 ASSESSMENT — PAIN SCALES - GENERAL: PAINLEVEL: NO PAIN (0)

## 2023-12-13 NOTE — PATIENT INSTRUCTIONS
Patient has been advised to complete a head CT and will plan to call with results. Should there still be residual hemorrhage noted would plan to repeat head CT again in 4 weeks. Further recommendations once updated head CT completed.

## 2023-12-13 NOTE — LETTER
12/13/2023         RE: Mary Ramirez  [address]  [place] [facility] [address]        Dear Colleague,    Thank you for referring your patient, Mary Ramirez, to the [facility] SP[facility]UR[facility]ERY. Please see a copy of my visit note below.    NEUROSURGERY CONSULTATION NOTE      Neurosurgery was asked to see this patient by Marlen Mccormick MD for evaluation of traumatic SAH.       CONSULTATION ASSESSMENT AND PLAN:    Patient has been advised to complete a head CT and will plan to call with results. Should there still be residual hemorrhage noted would plan to repeat head CT again in 4 weeks. Further recommendations once updated head CT completed.     I spent more than 20 minutes in this apt, examining the pt, reviewing the scans, reviewing notes from chart, discussing treatment options with risks and benefits and coordinating care.     Queenie Randolph PA-C      HPI:  Ms. Ramirez is a 43 year old female who presents for further evaluation of her traumatic SAH noted on 11/10/2023. She notes that she was in [place] when she felt an on coming panic attack and had a alcoholic beverage of which she said she had too quickly and ending up falling face first. She had LOC but is unsure for how long and was brought to the ED and noted to a have a SAH and was transferred to [facility]. She states that her memory is not completley clear at that time of the event but notes that following her hospitalization she had been doing well. She denies any headaches, nausea, emesis, or visual changes. She had two laceration on her right face forehead and chin of which the chin laceration required sutures. She denies any gait instability or balance difficulty. BP elevated today as she is anxious but states that it is usually not elevated. Also is not on any blood thinners and had only taken Tylenol for slight facial pain that has since resolved.     Past Medical History:   Diagnosis Date     Depressive disorder, not  elsewhere classified     Took Paxil for a short time     Irritable bowel syndrome      Pyelonephritis, unspecified age 14       Past Surgical History:   Procedure Laterality Date     APPENDECTOMY       CHOLECYSTECTOMY, LAPOROSCOPIC      Cholecystectomy, Laparoscopic     FRACTURE TX, WRIST RT/LT  2003    Left wrist, closed reduction       REVIEW OF SYSTEMS:   14 point review of systems negative with exception to HPI     MEDICATIONS:    Current Outpatient Medications   Medication Sig Dispense Refill     escitalopram (LEXAPRO) 10 MG tablet Take 1.5 tablets (15 mg) by mouth daily 135 tablet 2     hydrOXYzine (ATARAX) 25 MG tablet Take 1 tablet (25 mg) by mouth 3 times daily as needed for anxiety 30 tablet 1     LORazepam (ATIVAN) 0.5 MG tablet Take 1 tablet (0.5 mg) by mouth every 6 hours as needed for anxiety 15 tablet 0         ALLERGIES/SENSITIVITIES:     Allergies   Allergen Reactions     Morphine And Related Nausea       PERTINENT SOCIAL HISTORY:   Social History     Socioeconomic History     Marital status: Single     Number of children: 1   Occupational History     Occupation: .     Employer: [employer]   Tobacco Use     Smoking status: Some Days     Packs/day: 0.10     Years: 10.00     Additional pack years: 0.00     Total pack years: 1.00     Types: Cigarettes     Last attempt to quit: 2003     Years since quittin.4     Smokeless tobacco: Never   Vaping Use     Vaping Use: Never used   Substance and Sexual Activity     Alcohol use: Yes     Comment: rare     Drug use: No     Sexual activity: Not Currently     Partners: Male     Social Determinants of Health     Financial Resource Strain: Low Risk  (2023)    Financial Resource Strain      Within the past 12 months, have you or your family members you live with been unable to get utilities (heat, electricity) when it was really needed?: No   Food Insecurity: Low Risk  (2023)    Food Insecurity      Within the  past 12 months, did you worry that your food would run out before you got money to buy more?: No      Within the past 12 months, did the food you bought just not last and you didn t have money to get more?: No   Transportation Needs: Low Risk  (11/28/2023)    Transportation Needs      Within the past 12 months, has lack of transportation kept you from medical appointments, getting your medicines, non-medical meetings or appointments, work, or from getting things that you need?: No   Interpersonal Safety: Low Risk  (11/28/2023)    Interpersonal Safety      Do you feel physically and emotionally safe where you currently live?: Yes      Within the past 12 months, have you been hit, slapped, kicked or otherwise physically hurt by someone?: No      Within the past 12 months, have you been humiliated or emotionally abused in other ways by your partner or ex-partner?: No   Housing Stability: Low Risk  (11/28/2023)    Housing Stability      Do you have housing? : Yes      Are you worried about losing your housing?: No         FAMILY HISTORY:  Family History   Problem Relation Age of Onset     Thyroid Disease Mother         hypo     Hypertension Father      Breast Cancer Maternal Grandmother      C.A.D. Maternal Grandfather         CABG-in his late 50's     Diabetes Maternal Grandfather      Family History Negative Paternal Grandfather         was killed in MVA     C.A.D. Maternal Uncle         MI at age 43     Diabetes Paternal Aunt      Cerebrovascular Disease No family hx of      Cancer - colorectal No family hx of      Alcohol/Drug No family hx of         PHYSICAL EXAM:   Constitutional: LMP 10/31/2023 (Approximate)      Mental Status: A & O in no acute distress.  Affect is appropriate.  Speech is fluent.  Recent and remote memory are intact.  Attention span and concentration are normal.     Cranial Nerves: CN1: grossly intact per patient recall. CN2: No funduscopic exam performed. CN3,4 & 6: Pupillary light response,  lateral and vertical gaze normal.  No nystagmus.  Visual fields are full to confrontation. CN5: Intact to touch CN7: No facial weakness, smile, facial symmetry intact. CN8: Intact to spoken voice. CN9&10: Gag reflex, uvula midline, palate rises with phonation. CN11: Shoulder shrug 5/5 intact bilaterally. CN12: Tongue midline and moves freely from side to side.     Motor: No pronator drift of upper extremity. Normal bulk and tone all muscle groups of upper and lower extremities.    Strength:     Sensory: Sensation intact bilaterally to light touch.     Coordination; finger to nose, heel to shin, rapid alternating movements smooth and rhythmic. Romberg intact. Heel/toe/tandem gait intact.  Normal gait and station.     Reflexes; supinator, biceps, triceps, knee/ ankle jerk intact. No hoffmans/babinski/ clonus.    IMAGING:  I personally reviewed all radiographic images   IMPRESSION: head CT 11/10/2023  1. Right frontal scalp contusion/hematoma without bony fracture or deformity.   2. Very small volume of subarachnoid blood within the right sylvian fissure.         Cc:   Ekta Coello                 Again, thank you for allowing me to participate in the care of your patient.        Sincerely,        Queenie Randolph PA-C

## 2023-12-13 NOTE — PROGRESS NOTES
NEUROSURGERY CONSULTATION NOTE      Neurosurgery was asked to see this patient by Marlen Mccormick MD for evaluation of traumatic SAH.       CONSULTATION ASSESSMENT AND PLAN:    Patient has been advised to complete a head CT and will plan to call with results. Should there still be residual hemorrhage noted would plan to repeat head CT again in 4 weeks. Further recommendations once updated head CT completed.     I spent more than 20 minutes in this apt, examining the pt, reviewing the scans, reviewing notes from chart, discussing treatment options with risks and benefits and coordinating care.     Queenie Randolph PA-C      HPI:  Ms. Ramirez is a 43 year old female who presents for further evaluation of her traumatic SAH noted on 11/10/2023. She notes that she was in [place] when she felt an on coming panic attack and had a alcoholic beverage of which she said she had too quickly and ending up falling face first. She had LOC but is unsure for how long and was brought to the ED and noted to a have a SAH and was transferred to [facility]. She states that her memory is not completley clear at that time of the event but notes that following her hospitalization she had been doing well. She denies any headaches, nausea, emesis, or visual changes. She had two laceration on her right face forehead and chin of which the chin laceration required sutures. She denies any gait instability or balance difficulty. BP elevated today as she is anxious but states that it is usually not elevated. Also is not on any blood thinners and had only taken Tylenol for slight facial pain that has since resolved.     Past Medical History:   Diagnosis Date    Depressive disorder, not elsewhere classified     Took Paxil for a short time    Irritable bowel syndrome     Pyelonephritis, unspecified age 14       Past Surgical History:   Procedure Laterality Date    APPENDECTOMY      CHOLECYSTECTOMY, LAPOROSCOPIC      Cholecystectomy, Laparoscopic     FRACTURE TX, WRIST RT/LT  2003    Left wrist, closed reduction       REVIEW OF SYSTEMS:   14 point review of systems negative with exception to HPI     MEDICATIONS:    Current Outpatient Medications   Medication Sig Dispense Refill    escitalopram (LEXAPRO) 10 MG tablet Take 1.5 tablets (15 mg) by mouth daily 135 tablet 2    hydrOXYzine (ATARAX) 25 MG tablet Take 1 tablet (25 mg) by mouth 3 times daily as needed for anxiety 30 tablet 1    LORazepam (ATIVAN) 0.5 MG tablet Take 1 tablet (0.5 mg) by mouth every 6 hours as needed for anxiety 15 tablet 0         ALLERGIES/SENSITIVITIES:     Allergies   Allergen Reactions    Morphine And Related Nausea       PERTINENT SOCIAL HISTORY:   Social History     Socioeconomic History    Marital status: Single    Number of children: 1   Occupational History    Occupation: .     Employer: [employer]   Tobacco Use    Smoking status: Some Days     Packs/day: 0.10     Years: 10.00     Additional pack years: 0.00     Total pack years: 1.00     Types: Cigarettes     Last attempt to quit: 2003     Years since quittin.4    Smokeless tobacco: Never   Vaping Use    Vaping Use: Never used   Substance and Sexual Activity    Alcohol use: Yes     Comment: rare    Drug use: No    Sexual activity: Not Currently     Partners: Male     Social Determinants of Health     Financial Resource Strain: Low Risk  (2023)    Financial Resource Strain     Within the past 12 months, have you or your family members you live with been unable to get utilities (heat, electricity) when it was really needed?: No   Food Insecurity: Low Risk  (2023)    Food Insecurity     Within the past 12 months, did you worry that your food would run out before you got money to buy more?: No     Within the past 12 months, did the food you bought just not last and you didn t have money to get more?: No   Transportation Needs: Low Risk  (2023)    Transportation Needs      Within the past 12 months, has lack of transportation kept you from medical appointments, getting your medicines, non-medical meetings or appointments, work, or from getting things that you need?: No   Interpersonal Safety: Low Risk  (11/28/2023)    Interpersonal Safety     Do you feel physically and emotionally safe where you currently live?: Yes     Within the past 12 months, have you been hit, slapped, kicked or otherwise physically hurt by someone?: No     Within the past 12 months, have you been humiliated or emotionally abused in other ways by your partner or ex-partner?: No   Housing Stability: Low Risk  (11/28/2023)    Housing Stability     Do you have housing? : Yes     Are you worried about losing your housing?: No         FAMILY HISTORY:  Family History   Problem Relation Age of Onset    Thyroid Disease Mother         hypo    Hypertension Father     Breast Cancer Maternal Grandmother     C.A.D. Maternal Grandfather         CABG-in his late 50's    Diabetes Maternal Grandfather     Family History Negative Paternal Grandfather         was killed in MVA    C.A.D. Maternal Uncle         MI at age 43    Diabetes Paternal Aunt     Cerebrovascular Disease No family hx of     Cancer - colorectal No family hx of     Alcohol/Drug No family hx of         PHYSICAL EXAM:   Constitutional: LMP 10/31/2023 (Approximate)      Mental Status: A & O in no acute distress.  Affect is appropriate.  Speech is fluent.  Recent and remote memory are intact.  Attention span and concentration are normal.     Cranial Nerves: CN1: grossly intact per patient recall. CN2: No funduscopic exam performed. CN3,4 & 6: Pupillary light response, lateral and vertical gaze normal.  No nystagmus.  Visual fields are full to confrontation. CN5: Intact to touch CN7: No facial weakness, smile, facial symmetry intact. CN8: Intact to spoken voice. CN9&10: Gag reflex, uvula midline, palate rises with phonation. CN11: Shoulder shrug 5/5 intact  bilaterally. CN12: Tongue midline and moves freely from side to side.     Motor: No pronator drift of upper extremity. Normal bulk and tone all muscle groups of upper and lower extremities.    Strength:     Sensory: Sensation intact bilaterally to light touch.     Coordination; finger to nose, heel to shin, rapid alternating movements smooth and rhythmic. Romberg intact. Heel/toe/tandem gait intact.  Normal gait and station.     Reflexes; supinator, biceps, triceps, knee/ ankle jerk intact. No hoffmans/babinski/ clonus.    IMAGING:  I personally reviewed all radiographic images   IMPRESSION: head CT 11/10/2023  1. Right frontal scalp contusion/hematoma without bony fracture or deformity.   2. Very small volume of subarachnoid blood within the right sylvian fissure.         Cc:   Ekta Coello

## 2024-01-12 ENCOUNTER — MYC REFILL (OUTPATIENT)
Dept: FAMILY MEDICINE | Facility: CLINIC | Age: 44
End: 2024-01-12
Payer: COMMERCIAL

## 2024-01-12 DIAGNOSIS — F41.9 ANXIETY: ICD-10-CM

## 2024-01-15 RX ORDER — LORAZEPAM 0.5 MG/1
0.5 TABLET ORAL EVERY 6 HOURS PRN
Qty: 15 TABLET | Refills: 0 | Status: SHIPPED | OUTPATIENT
Start: 2024-01-15 | End: 2024-04-17

## 2024-01-15 RX ORDER — HYDROXYZINE HYDROCHLORIDE 25 MG/1
25 TABLET, FILM COATED ORAL 3 TIMES DAILY PRN
Qty: 30 TABLET | Refills: 1 | Status: SHIPPED | OUTPATIENT
Start: 2024-01-15 | End: 2024-08-12

## 2024-02-21 PROBLEM — R12 HEARTBURN: Status: ACTIVE | Noted: 2024-02-21

## 2024-03-20 ENCOUNTER — VIRTUAL VISIT (OUTPATIENT)
Dept: PSYCHOLOGY | Facility: CLINIC | Age: 44
End: 2024-03-20
Attending: FAMILY MEDICINE
Payer: COMMERCIAL

## 2024-03-20 DIAGNOSIS — F41.1 GENERALIZED ANXIETY DISORDER: Primary | ICD-10-CM

## 2024-03-20 PROCEDURE — 90834 PSYTX W PT 45 MINUTES: CPT | Mod: 95 | Performed by: PSYCHOLOGIST

## 2024-03-20 ASSESSMENT — PATIENT HEALTH QUESTIONNAIRE - PHQ9
10. IF YOU CHECKED OFF ANY PROBLEMS, HOW DIFFICULT HAVE THESE PROBLEMS MADE IT FOR YOU TO DO YOUR WORK, TAKE CARE OF THINGS AT HOME, OR GET ALONG WITH OTHER PEOPLE: VERY DIFFICULT
SUM OF ALL RESPONSES TO PHQ QUESTIONS 1-9: 5
SUM OF ALL RESPONSES TO PHQ QUESTIONS 1-9: 5

## 2024-03-22 NOTE — PROGRESS NOTES
[facility]   Mental Health & Addiction Services     Progress Note - Initial Visit    Patient  Name:  Mary Ramirez Date: 3/20/24         Service Type: Individual     Visit Start Time: 1:02pm Visit End Time: 1:54pm    Visit #: 1 52 minutes    Attendees: Client attended alone    Service Modality:  Video Visit:      Provider verified identity through the following two step process.  Patient provided:  Patient     Telemedicine Visit: The patient's condition can be safely assessed and treated via synchronous audio and visual telemedicine encounter.      Reason for Telemedicine Visit: Services only offered telehealth    Originating Site (Patient Location): Patient's home    Distant Site (Provider Location): Provider Remote Setting- Home Office    Consent:  The patient/guardian has verbally consented to: the potential risks and benefits of telemedicine (video visit) versus in person care; bill my insurance or make self-payment for services provided; and responsibility for payment of non-covered services.     Patient would like the video invitation sent by:  Send to e-mail at: [email or web address]    Mode of Communication:  Video Conference via [facility]    Distant Location (Provider):  Off-site    As the provider I attest to compliance with applicable laws and regulations related to telemedicine.       DATA:   Interactive Complexity: No   Crisis: No     Presenting Concerns/  Current Stressors:   Client struggles with sustaining attention and completing tasks.      ASSESSMENT:  Mental Status Assessment:  Appearance:   Appropriate   Eye Contact:   Good   Psychomotor Behavior: Normal   Attitude:   Cooperative  Pleasant  Orientation:   All  Speech   Rate / Production: Normal/ Responsive   Volume:  Normal   Mood:    Anxious  Normal  Affect:    Appropriate   Thought Content:  Clear   Thought Form:  Coherent   Insight:    Good     Assessments completed prior to this visit:  The following assessments were  completed by patient for this visit:  PHQ9:       1/15/2007     2:30 PM 6/25/2020     1:35 PM 8/4/2022    10:09 AM 11/9/2022     9:39 AM 3/20/2024     7:54 AM   PHQ-9 SCORE   PHQ-9 Total Score 15       PHQ-9 Total Score MyChart   1 (Minimal depression) 17 (Moderately severe depression) 5 (Mild depression)   PHQ-9 Total Score  12 1 17 5     CAGE-AID:       3/20/2024     8:11 AM   CAGE-AID Total Score   Total Score 3   Total Score MyChart 3 (A total score of 2 or greater is considered clinically significant)     PROMIS 10-Global Health (all questions and answers displayed):       3/20/2024     8:11 AM   PROMIS 10   In general, would you say your health is: Good   In general, would you say your quality of life is: Good   In general, how would you rate your physical health? Poor   In general, how would you rate your mental health, including your mood and your ability to think? Good   In general, how would you rate your satisfaction with your social activities and relationships? Fair   In general, please rate how well you carry out your usual social activities and roles Good   To what extent are you able to carry out your everyday physical activities such as walking, climbing stairs, carrying groceries, or moving a chair? Completely   In the past 7 days, how often have you been bothered by emotional problems such as feeling anxious, depressed, or irritable? Sometimes   In the past 7 days, how would you rate your fatigue on average? None   In the past 7 days, how would you rate your pain on average, where 0 means no pain, and 10 means worst imaginable pain? 0   In general, would you say your health is: 3   In general, would you say your quality of life is: 3   In general, how would you rate your physical health? 1   In general, how would you rate your mental health, including your mood and your ability to think? 3   In general, how would you rate your satisfaction with your social activities and relationships? 2   In  general, please rate how well you carry out your usual social activities and roles. (This includes activities at home, at work and in your community, and responsibilities as a parent, child, spouse, employee, friend, etc.) 3   To what extent are you able to carry out your everyday physical activities such as walking, climbing stairs, carrying groceries, or moving a chair? 5   In the past 7 days, how often have you been bothered by emotional problems such as feeling anxious, depressed, or irritable? 3   In the past 7 days, how would you rate your fatigue on average? 1   In the past 7 days, how would you rate your pain on average, where 0 means no pain, and 10 means worst imaginable pain? 0   Global Mental Health Score 11   Global Physical Health Score 16   PROMIS TOTAL - SUBSCORES 27         Safety Issues and Plan for Safety and Risk Management:   [place] Suicide Severity Rating Scale (Lifetime/Recent)      3/20/2024     1:28 PM   [place] Suicide Severity Rating (Lifetime/Recent)   Q1 Wish to be Dead (Lifetime) N   Q2 Non-Specific Active Suicidal Thoughts (Lifetime) N   Actual Attempt (Lifetime) N   Has subject engaged in non-suicidal self-injurious behavior? (Lifetime) N   Interrupted Attempts (Lifetime) N   Aborted or Self-Interrupted Attempt (Lifetime) N   Preparatory Acts or Behavior (Lifetime) N   Calculated C-SSRS Risk Score (Lifetime/Recent) No Risk Indicated     Patient denies current fears or concerns for personal safety.  Patient denies current or recent suicidal ideation or behaviors.  Patient denies current or recent homicidal ideation or behaviors.  Patient denies current or recent self injurious behavior or ideation.  Patient denies other safety concerns.  Recommended that patient call 911 or go to the local ED should there be a change in any of these risk factors.  Patient reports there are no firearms in the house.     Diagnostic Criteria:  Generalized Anxiety Disorder  A. Excessive anxiety and  worry about a number of events or activities (such as work or school performance).   B. The person finds it difficult to control the worry.  C. Select 3 or more symptoms (required for diagnosis). Only one item is required in children.   - Restlessness or feeling keyed up or on edge.    - Being easily fatigued.    - Difficulty concentrating or mind going blank.    - Irritability.    - Sleep disturbance (difficulty falling or staying asleep, or restless unsatisfying sleep).   D. The focus of the anxiety and worry is not confined to features of an Axis I disorder.  E. The anxiety, worry, or physical symptoms cause clinically significant distress or impairment in social, occupational, or other important areas of functioning.   F. The disturbance is not due to the direct physiological effects of a substance (e.g., a drug of abuse, a medication) or a general medical condition (e.g., hyperthyroidism) and does not occur exclusively during a Mood Disorder, a Psychotic Disorder, or a Pervasive Developmental Disorder.    - The aformentioned symptoms began 25 year(s) ago and occurs 2 days per week and is experienced as moderate.      DSM5 Diagnoses: (Sustained by DSM5 Criteria Listed Above)  Diagnoses: 300.02 (F41.1) Generalized Anxiety Disorder  Psychosocial & Contextual Factors: Client works full-time and lives with her daughter.  Intervention:   Psychoeducation; Skill review/identification & recommendation; Pattern recognition; Socratic Questioning; Active listening, validation, and other rapport building skills; Administer and explain screeners; Answered questions; Assigned homework.  Collateral Reports Completed:  Not Applicable      PLAN: (Homework, other):  1. Provider will continue Diagnostic Assessment.  Patient was given the following to do until next session:  The client was sent the MMPI-3, CNS Vital Signs, and ADHD questionnaires to complete and return by next session, if possible.    2. Provider recommended the  following referrals: N/A.      3.  Suicide Risk and Safety Concerns were assessed for Mary Ramirez.    Patient meets the following risk assessment and triage: Patient denied any current/recent/lifetime history of suicidal ideation and/or behaviors.  No safety plan indicated at this time.       Kwaku Mina PsyD  March 22, 2024

## 2024-03-23 ENCOUNTER — HEALTH MAINTENANCE LETTER (OUTPATIENT)
Age: 44
End: 2024-03-23

## 2024-03-28 ENCOUNTER — VIRTUAL VISIT (OUTPATIENT)
Dept: PSYCHOLOGY | Facility: CLINIC | Age: 44
End: 2024-03-28
Payer: COMMERCIAL

## 2024-03-28 DIAGNOSIS — F41.1 GENERALIZED ANXIETY DISORDER: Primary | ICD-10-CM

## 2024-03-28 PROCEDURE — 90834 PSYTX W PT 45 MINUTES: CPT | Mod: 95 | Performed by: PSYCHOLOGIST

## 2024-03-28 NOTE — PROGRESS NOTES
[facility]                                     Progress Note    Patient Name: Mary Ramirez  Date: 3/28/24         Service Type: Individual      Session Start Time: 9:00am Session End Time: 9:52am     Session Length: 52 minutes    Session #: 2    Attendees: Client attended alone    Service Modality:  Video Visit:      Provider verified identity through the following two step process.  Patient provided:  Patient photo    Telemedicine Visit: The patient's condition can be safely assessed and treated via synchronous audio and visual telemedicine encounter.      Reason for Telemedicine Visit: Services only offered telehealth    Originating Site (Patient Location): Patient's home    Distant Site (Provider Location): Provider Remote Setting- Home Office    Consent:  The patient/guardian has verbally consented to: the potential risks and benefits of telemedicine (video visit) versus in person care; bill my insurance or make self-payment for services provided; and responsibility for payment of non-covered services.     Patient would like the video invitation sent by:  Send to e-mail at: [email or web address]    Mode of Communication:  Video Conference via [facility]    Distant Location (Provider):  Off-site    As the provider I attest to compliance with applicable laws and regulations related to telemedicine.    DATA  Interactive Complexity: No  Crisis: No        Progress Since Last Session (Related to Symptoms / Goals / Homework):   Symptoms: No change : Stable.    Homework: Did not complete      Episode of Care Goals: Minimal progress - PREPARATION (Decided to change - considering how); Intervened by negotiating a change plan and determining options / strategies for behavior change, identifying triggers, exploring social supports, and working towards setting a date to begin behavior change     Current / Ongoing Stressors and Concerns:   Client struggles with sustaining attention and completing tasks.       Treatment Objective(s) Addressed in This Session:   Discussed emotion regulation skills: Having a conversation with emotions.  Partial Diagnostic Assessment; Helped client find needed testing emails.     Intervention:   Psychoeducation; Skill review/identification & recommendation; Pattern recognition; Socratic Questioning; Active listening, validation, and other rapport building skills; Administer and explain screeners; Reviewed homework; Assisted client in finding required emails/tests.    Assessments completed prior to visit:  The following assessments were completed by patient for this visit: None.     ASSESSMENT: Current Emotional / Mental Status (status of significant symptoms):   Risk status (Self / Other harm or suicidal ideation)   Patient denies current fears or concerns for personal safety.   Patient denies current or recent suicidal ideation or behaviors.   Patient denies current or recent homicidal ideation or behaviors.   Patient denies current or recent self injurious behavior or ideation.   Patient denies other safety concerns.   Patient reports there has been no change in risk factors since their last session.     Patient reports there has been no change in protective factors since their last session.     Recommended that patient call 911 or go to the local ED should there be a change in any of these risk factors.     Appearance:   Appropriate    Eye Contact:   Fair    Psychomotor Behavior: Normal    Attitude:   Cooperative  Friendly Pleasant   Orientation:   All   Speech    Rate / Production: Normal/ Responsive Normal     Volume:  Normal    Mood:    Normal Sad    Affect:    Appropriate  Tearful   Thought Content:  Clear    Thought Form:  Coherent    Insight:    Good  and Fair      Medication Review:   No changes to current psychiatric medication(s)     Medication Compliance:   NA     Changes in Health Issues:   None reported     Chemical Use Review:   Substance Use: Chemical use reviewed, no  active concerns identified      Tobacco Use: No current tobacco use.      Diagnosis:  1. Generalized Anxiety Disorder        Collateral Reports Completed:   Not Applicable    PLAN: (Patient Tasks / Therapist Tasks / Other)  The client was helped to find the appropriate emails in order to complete and return the MMPI-3, CNS Vital Signs, and ADHD questionnaires  by next session, if possible.        Kwaku Mina PsyD  March 28, 2024

## 2024-04-15 ENCOUNTER — VIRTUAL VISIT (OUTPATIENT)
Dept: PSYCHOLOGY | Facility: CLINIC | Age: 44
End: 2024-04-15
Payer: COMMERCIAL

## 2024-04-15 DIAGNOSIS — F41.1 GENERALIZED ANXIETY DISORDER: Primary | ICD-10-CM

## 2024-04-15 DIAGNOSIS — F90.0 ATTENTION DEFICIT HYPERACTIVITY DISORDER, INATTENTIVE TYPE: ICD-10-CM

## 2024-04-15 PROCEDURE — 90791 PSYCH DIAGNOSTIC EVALUATION: CPT | Mod: 95 | Performed by: PSYCHOLOGIST

## 2024-04-15 NOTE — PROGRESS NOTES
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 obtained from the medical record and  provided by patient who is a good historian.      Patient will have open access to their mental health medical record.    9.   Interactive Complexity: No    10. Safety Plan:      Provider Name/ Credentials:  Kwaku Mina PsyD, RAEANN                    April 15, 2024

## 2024-04-17 ENCOUNTER — MYC REFILL (OUTPATIENT)
Dept: FAMILY MEDICINE | Facility: CLINIC | Age: 44
End: 2024-04-17
Payer: COMMERCIAL

## 2024-04-17 DIAGNOSIS — F41.9 ANXIETY: ICD-10-CM

## 2024-04-17 RX ORDER — LORAZEPAM 0.5 MG/1
0.5 TABLET ORAL EVERY 6 HOURS PRN
Qty: 15 TABLET | Refills: 0 | Status: SHIPPED | OUTPATIENT
Start: 2024-04-17 | End: 2024-08-12

## 2024-04-17 NOTE — TELEPHONE ENCOUNTER
Pending Prescriptions:                       Disp   Refills    LORazepam (ATIVAN) 0.5 MG tablet          15 tab*0            Sig: Take 1 tablet (0.5 mg) by mouth every 6 hours as           needed for anxiety    Routing refill request to provider for review/approval because:  Drug not on the G refill protocol       Demar Quesada RN

## 2024-05-13 ENCOUNTER — MYC REFILL (OUTPATIENT)
Dept: FAMILY MEDICINE | Facility: CLINIC | Age: 44
End: 2024-05-13
Payer: COMMERCIAL

## 2024-05-13 DIAGNOSIS — F41.9 ANXIETY: ICD-10-CM

## 2024-05-13 RX ORDER — ESCITALOPRAM OXALATE 10 MG/1
15 TABLET ORAL DAILY
Qty: 135 TABLET | Refills: 0 | Status: SHIPPED | OUTPATIENT
Start: 2024-05-13 | End: 2024-08-12

## 2024-05-20 NOTE — PROGRESS NOTES
[facility]    Provider Name:  Kwaku Mina PsyD     Credentials:        ADHD Assessment Results, Summary, and Recommendations      PATIENT'S NAME: Mary Ramirez    MRN: [id number]    ACCT. NUMBER:  [id number]    DATE OF SERVICE: 5/23/24    SERVICE MODALITY:  Video Visit:      Provider verified identity through the following two step process.  Patient provided:  Patient photo    Telemedicine Visit: The patient's condition can be safely assessed and treated via synchronous audio and visual telemedicine encounter.      Reason for Telemedicine Visit: Services only offered telehealth    Originating Site (Patient Location): Patient's home    Distant Site (Provider Location): Provider Remote Setting- Home Office    Consent:  The patient/guardian has verbally consented to: the potential risks and benefits of telemedicine (video visit) versus in person care; bill my insurance or make self-payment for services provided; and responsibility for payment of non-covered services.     Patient would like the video invitation sent by:  Send to e-mail at: [email or web address]    Mode of Communication:  [facility]    Distant Location (Provider):  Off-site    As the provider I attest to compliance with applicable laws and regulations related to telemedicine.     Date(s) of assessment:  Diagnostic Assessment 3/20/24, 3/28/24, & 4/15/24, [place] self-report and collateral measures scored and interpreted 5/19/24, and MMPI 5/20/24       Information about appointment:  Patient attended  four  sessions to aid in determining patient's mental health diagnosis or diagnoses and treatment recommendations that best address patient concerns. Patient records includingmedical were reviewed. A diagnostic assessment was conducted at the initial appointment. Patient completed several rating scales to assist in assessing attention-related and other mental health symptoms that may be causing impairments in  "functioning. Rating scales were also completed by a collateral contact.    Assessment tools:   Some measures may have been completed remotely due to virtual care, in which case observation of task completion was not possible.    [place] Adult ADHD Rating Scale-IV: Self and Other Reports (BAARS-IV), [place] Functional Impairment Scale: Self and Other Reports (BFIS), [place] Deficits in Executive Functioning Scale: Self and Other Reports (BDEFS), Patient Health Questionnaire-9 (PHQ-9), Generalized Anxiety Disorder-7 (HAL-7), [place] Multiphasic Personality Inventory (MMPI), and CNS Vital Signs Neurocognitive Battery  [email or web address]     Assessment Results:    [place] Adult ADHD Rating Scale-IV: Self and Other Reports (BAARS-IV)  The BAARS-IV assesses for symptoms of ADHD that are experienced in one's daily life. This assessment measure includes self and collateral rating scales designed to provide information regarding current and childhood symptoms of ADHD including inattention, hyperactivity, and impulsivity. Self-report scores are reported as percentiles. Scores at the 76th-83rd percentile are considered marginal, scores at the 84th-92nd percentile are considered borderline, scores at the 93rd-95th percentile are considered mild, scores at the 96th-98th percentile are considered moderate, and those at the 99th percentile are considered severe. Collateral or \"other\" rating scales are reported as number of symptoms observed in comparison to those reported by the patient. Norms and percentile scores are not available for collateral reports.     Current Symptoms Scale--Self Report:   Patient completed the self-report inventory of current symptoms. The results indicate that the patient's Total ADHD Score was 41 which places her in the 96th percentile for overall ADHD symptoms. In addition, the patient endorsed 7/9 (98th percentile) Inattention symptoms, 1/5 (86th percentile) Hyperactivity symptoms, " 0/4 (1st-75th percentile) Impulsivity symptoms, and 3/9 (90th percentile) Sluggish Cognitive Tempo symptoms. Patient indicated that the reported symptoms have resulted in impaired functioning in school, home, work, and social relationships. Overall, the results suggest the patient is expeiencing Moderate ADHD symptoms.    Current Symptoms Scale--Other Report:  Patient's mother completed the collateral report inventory of current symptoms. Based on the collateral contact's observation of symptoms, the patient demonstrates 5/9 Inattention symptoms, 2/5 Hyperactivity symptoms, 0/4 Impulsivity symptoms, and 6/9 Sluggish Cognitive Tempo symptoms. The patient's Total ADHD Score was 42. The collateral contact indicated the patient demonstrates impaired functioning in school, home, work, and social relationships} The collateral- and self-report scores are not significantly different.    Childhood Symptoms Scale--Self-Report:  Patient completed the self-report inventory of childhood symptoms. The results indicate that the patient's Total ADHD Score was 52 which places her in the 98th percentile for overall ADHD symptoms in childhood. In addition, the patient endorsed 9/9 (99th+ percentile) Inattention symptoms and  4/9 (94th percentile) Hyperactivity-Impulsivity symptoms. Patient indicated that the reported symptoms resulted in impaired functioning in school and social relationships Overall, the results suggest the patient experienced  Moderate symptoms of ADHD as a child.     Childhood Symptoms Scale--Other Report:  Patient's mother completed the collateral report inventory of childhood symptoms. Based on the collateral contact's recollection of patient's childhood symptoms, the patient demonstrated 5/9 Inattention symptoms and 4/9 Hyperactivity-Impulsivity symptoms. The patient's Total ADHD Score was 49. The collateral contact indicated the patient demonstrates impaired functioning in school and social relationshipsThe  "collateral- and self-report scores are not significantly different.                          [place] Functional Impairment Scale: Self and Other Reports (BFIS)  The BFIS is used to assess the level of impairment in major life/daily activities that may be due to mental health symptoms. This assessment measure includes self and collateral rating scales. Self-report scores are reported as percentiles. Scores at the 76th-83rd percentile are considered marginal, scores at the 84th-92nd percentile are considered borderline, scores at the 93rd-95th percentile are considered mild, scores at the 96th-98th percentile are considered moderate, and those at the 99th percentile are considered severe.Collateral or \"other\" rating scales are reported as number of symptoms observed in comparison to those reported by the patient. Norms and percentile scores are not available for collateral reports.     Results indicate the patient identified impairment (scores at or greater than 93rd percentile) in the following areas: home-chores, work, social-strangers, social-friends, money management, daily responsibilities, and health maintenance The patient's Mean Impairment Score was 5.8 (94th percentile) indicating the patient is reporting Mild impairment in functioning across domains. Patient's mother completed the collateral rating scale, which indicated similar results. The collateral contact's scores were generally higher than the patient's report.     [place] Deficits in Executive Functioning Scale (BDEFS)  The BDEFS is a measure used for evaluating adult executive functioning in daily activities.This assessment measure includes self and collateral rating scales. Self-report scores are reported as percentiles. Scores at the 76th-83rd percentile are considered marginal, scores at the 84th-92nd percentile are considered borderline, scores at the 93rd-95th percentile are considered mild, scores at the 96th-98th percentile are considered " "moderate, and those at the 99th percentile are considered severe.Collateral or \"other\" rating scales are reported as number of symptoms observed in comparison to those reported by the patient. Norms and percentile scores are not available for collateral reports.     Results indicate the patient's Total Executive Functioning Score was 224 (96th percentile). The ADHD-Executive Functioning Index score was 28 (96th percentile). These scores suggest the patient has Moderate deficits in executive functioning. These deficits are likely to be due to ADHD. Results indicate the patient identified significant deficits in the following areas: self-management to time 97th percentile , self-organization/problem-solving 97th percentile,  self-restraint 94th percentile, and self-motivation 98th percentile . Patient's moth completed the collateral rating scale, which indicated similar results. The collateral contact's scores were generally higher than the patient's report.    CNS Vital Signs:  The CNS Vital Signs (CNS VS) is a computerized neuropsychological tests to evaluate the neurocognitive status of patients and covers a range of mental processes from simple motor performance, attention, memory, to executive functions.  There are five Domains that are considered Most Sensitive to ADHD: Simple Attention, Complex Attention, Cognitive Flexibility, Processing Speed, and Executive Functioning, meaning that, if ADHD is present, these are the five domains most effected.  The CNS VS can also be used to help depict a pattern of domains Most Sensitive to Depression and/or Sleep deprivation (Sleep).  Two of the five domains, Executive Functioning and Cognitive Flexibility, are Most Sensitive to all three domains: ADHD, Depression, and Sleep.  A third domain, Complex Attention, is Most Sensitive to both ADHD and Sleep, Moderate Sensitivity to Depression.  Simple Attention is Most sensitive to ADHD, Moderate Sensitivity to Sleep, and Less " "Sensitive to Depression.    Reviewed results of CNS Vital Signs (CNS VS) testing, a neurocognitive test, revealed an low average Neurocognition Index - a form of a global score of overall neurocognitive functioning.  Relative strengths of the client's, based on \"Above Average\" scores, were in the areas of: Motor Speed measures one's ability to perform movements to produce and satisfy an intention towards a manual action and goal; and Reasoning measures how well a person is able to recognize, reason, and respond to non-verbal visual-abstract stimuli.     The client initially took the CNS VS on April 15, 2024.  Because the Stroop Test (ST) came back as \"Possibly Invalid.\" Two of the domains rated \"Most Sensitive\" to ADHD were marked as Invalid measures; therefore, the client was asked to retake the test.  The client retook the test on April 26, 2024.  The results of this testing were that all tests and measures were valid.  Because the entire second testing came back with all valid testings it was used for the interpretation of the client's overall processing ability below.    There were areas of relative weakness, based on below average scores:  Verbal Memory which measure how well a person can recognize, remember, and retrieve words.;  Reaction Time which measures how quickly a person can react to a simple and increasingly complex direction set; Complex Attention which measure how well a person can track and respond to a variety of stimuli over lengthy periods of time and/or perform mental tasks requiring vigilance quickly and accurately; Cognitive Flexibility which measures how well a person is able to adapt to rapidly changing and increasingly complex set of directions and/or to manipulate the information; Processing Speed which measures how well a person recognizes and processes information; Executive Functioning measures how well someone recognizes rules, categories, and manages or navigates rapid decision " making; and Simple Attention which is a measure of a person's ability to track and respond to a single defined stimulus over lengthy periods of time while performing vigilance and response inhibition quickly and accurately.      [place] Multiphasic Personality Inventory (MMPI)   There is evidence of consistent responding and that the test taker was able to comprehend and respond relevantly to the test items. There was no evidence of fixed, content-inconsistent responding.  There is no evidence of over-reporting. There is no evidence of under-reporting.  This MMPI-3 protocol likely is a valid reflection of the individual's stable personality and will be interpreted.    Others with similar profiles report a diffuse pattern of cognitive difficulties including memory problems, difficulties with attention and concentration, and possible confusion.  They also have a low tolerance for frustration and do not cope well with stress.    These individuals report significant emotional distress.  They report a broad range of symptoms and difficulties associated with demoralization, low positive emotions, and negative emotional experiences (e.g., low morale, depression, anxiety, feeling overwhelmed, helpless, pessimistic).  They report self-doubt and futility, as well as, inferior and insecure. They are self-disparaging, prone to rumination, intropunitive, and lack confidence and feel useless.  These individuals report being passive, indecisive, and inefficacious.  They have difficulty dealing with crisis situations and with small, inconsequential matters.  The client reported a lack of interest.  They present as pessimistic, socially introverted and disengaged, and lack energy.  They report various negative emotional experiences including anxiety, anger, and fear ( I feel kind of numb to that (fear).  Bad things just keep happening ). They are inhibited behaviorally because of negative emotions, experiences intrusive ideation,  are anger-prone, stress-reactive, experience problems with sleep, including nightmares, worry excessively, engage in obsessive rumination, perceives others as overly critical, and self-critical and guilt-prone.  They report an above-average level of stress and feel incapable of controlling their anxiety level ( I might think I'm good at it and not realize I'm not good at controlling my anxiety ).  They report being anger-prone.  They have problems with: anger, irritability, a low tolerance for frustration, hostility, and argumentativeness (Í don't like confrontation at all.  I try to avoid all that) .  They report avoiding social situations.  They experience significant problems with anhedonia, lacking interests, are pessimistic, and socially introverted.    They report disliking people and being around them.  They are asocial, socially introverted, and emotionally disconnected ( Maybe a little bit ).  They report not enjoying social events and avoiding social situations, including parties and other events where crowds are likely to gather.  They are introverted, have difficulty forming close relationships, and are emotionally restricted.  They report being shy, easily embarrassed, and uncomfortable around others.  They are inhibited, anxious and nervous in social situations, and viewed by others as socially awkward.    They report behaviors and experiences associated with hypomanic activation, such as excitability, impulsivity, and elevated mood.  They are restless and easily bored, overactivated as manifested in: poor impulse control, mood instability, excitability ( Sometime ), and may have a history of symptoms associated with manic or hypomanic episodes.  They report engaging in problematic nonplanful impulsive behavior and have poor impulse control and a possible history of hyperactivity behavior.   They report episodes of heightened excitation and energy level.  They experience excessive activation and have  had a history of symptoms associated with manic or hypomanic episodes.  They report a pattern of disconstrained behavior.  They are behaviorally constrained, engage in acting-out behaviors, act out impulsively, and are sensation- and excitement seeking.    Generalized Anxiety Disorder Questionnaire (HAL-7)  This self-report questionnaire is designed to assess for anxiety in adults. Patient s score of 1 indicates that they are experiencing minimum symptoms of anxiety.      Patient Health Questionnaire- 9 (PHQ-9)   This self-report questionnaire is designed to assess for depression in adults. Patient s score of 5 indicates that they are experiencing mild symptoms of depression.    Collateral Information   [place] Questionnaires for Other-.    Summary (based on clinical interview, review of records, test results):  Based on the client's [place] Questionnaires, HAL-7, PHQ-9, [place] Multiphasic Personality Inventory-2 (MMPI-3), CNS Vital Signs, and interview results, the client has been diagnosed with Attention-Deficit/Hyperactivity Disorder, Combined presentation, Moderate (ADHD), as well as, Generalized Anxiety Disorder, Major Depressive Disorder, Recurrent, In partial remission, Alcohol Use Disorder, Moderate, In early remission (by Hx), with a (Rule-in) Premenstrual dysphoric disorder .     The client's Self- and Collateral- (mother) report's scores for Current and Childhood symptoms were both clinically significant for ADHD symptoms (Inattention for current and childhood, as well as, Hyperactivity/Impulsivity for childhood only).  Both the client and their mother reported significant difficulties with the client's executive functioning.  They both reported significant difficulties with executive functioning overall, and with the executive components of:  Self-management to Time, Self-organization/Problem solving, Self-restraint and Self-motivation.  In addition, the ADHD-EF Index Score, a strong indicator  "of ADHD when at or above the 95th percentile, was scored at the 96th percentile for the Self-report (same percentage as overall) and at the 95th percentile for the childhood friend's report (down from 99th+ percentile overall).  Similarly, both the client and their mother reported significant symptoms for overall daily functioning.  Overall, the [place]annah Questionnaires were in support of ADHD for both the client's and his wife's report.     The client's PHQ-9 score of 5 indicates Mild symptoms of depression and their HAL-7 score of 1 indicated Minimal symptoms of anxiety.     The CNS Vital Signs (CNS VS) is a computerized neuropsychological tests to evaluate the neurocognitive status of patients and covers a range of mental processes from simple motor performance, attention, memory, to executive functions.  There are five Domains that are considered Most Sensitive to ADHD: Simple Attention, Complex Attention, Cognitive Flexibility, Processing Speed, and Executive Functioning, meaning that, if ADHD is present, these are the five domains most effected.  The CNS VS can also be used to help depict a pattern of domains most sensitive to Depression and/or Sleep deprivation (Sleep).  Two of the five domains, Executive Functioning and Cognitive Flexibility, are Most Sensitive to all three domains: ADHD, Depression, and Sleep.  A third domain, Complex Attention, is Most Sensitive to both ADHD and Sleep, Moderate Sensitivity to Depression.  Simple Attention is Most sensitive to ADHD, and Moderately Sensitive to both Depression and Sleep.  The last domain, Processing Speed is Most Sensitive to ADHD and Depression, Moderately Sensitive to Sleep.    The client initially took the CNS Vital Signs test on April 15, 2024.  One of the tests, Stroop Test (ST), came back as \"Possibly Invalid.\"  Because of this, multiple domain measurements were not valid; therefore, the client was asked to retake the test which she agreed to do.  The " "client retook the test on April 26, 2024.  This test came back with all tests valid and was interpreted.  The results of the second taking of the CNS VS test did show a pattern typical of ADHD.  The client scored in the Low Average to Very Low range in all five domains Most Sensitive to ADHD.  During what appears to be the Continuous Performance Test (second taking, the client acknowledged trying to \"anticipate when the next B (stimulus to respond to) was going to appear.\"  This could account for the more than average errors the client made on that test and is also a sign of impulsivity.  The client's first taking of the CNS VS also produced a valid Continuous Performance Test and it is worth noting she was in the bottom 1% for scores compared to the normative sample (she made the same number of Errors of Commission on both tests).  Another indication of ADHD is the client's score for Complex Attention.  This measurement was not a valid measurement on the first test; however, it was on the second testing.  The client scored in the bottom 2% of test-takers when compared to the normative sample.  The pattern from the client's CNS Vital Signs test(s) were indicative of someone with ADHD, as well as, someone with depression.  The client did report mild symptoms of depression on the PHQ-9.     Also part of the battery used for this ADHD Assessment was the [place] Sleepiness Scale (ESS) SF-8:  The ESS measures a person s general level of daytime sleepiness, or their average sleep propensity in daily life (ASP).  For the purposes of this questionnaire, Sleepiness (or sleep propensity) is defined as: The probability, ease or speed of making the transition from alert wakefulness, through drowsiness, to sleep under a given set of circumstances*.  ESS scores of 11-24 represent increasing levels of  excessive daytime sleepiness  (EDS).  The client obtained a ESS scores of 6 which is in the Higher Normal Daytime Sleepiness. " "Sleep deprivation was not likely a factor in the client's performance on the CNS VS test.     * As defined on the \"official website of the ESS on 5/25/24: https://[employer]ambisa/sleepiness/.     Also administered was the MMPI-3.  The Validity Scales showed evidence of consistent responding and that the test taker was able to comprehend and respond relevantly to the test items. There is no evidence of over-reporting, under-reporting, or fixed, content-inconsistent responding. The MMPI-3 protocol likely is a valid reflection of the individual's stable personality and will be interpreted.    The client reported significant emotional distress.  This included a broad range of symptoms and difficulties associated with demoralization, low positive emotions, and negative emotional experiences (e.g., low morale, depression, anxiety, feeling overwhelmed, helpless, pessimistic).  In addition, she reported being prone to rumination, being intropunitive, and lacking confidence and feeling useless.  She also indicated difficulties with memory, attention, and concentration.  She is introverted and avoids social situations (client may have struggled with social anxiety as a child).  She can be very self-critical and obsess over faults.  It is noted that the client did score high on the Hypomanic Activation scale, indicating the possible diagnosis of Bipolar (2) Disorder.  The client was interviewed more specifically about her symptoms and it was determined that it was more likely Hyperactivity/Impulsivity due to ADHD rather than Bipolar 2.    The client also reported traumatic events when she was in her mid-teens; therefore, she was administered the The PTSD Checklist (PCL-5) SF-20, a self-report screener that may indicate the possible presence of PTSD.  The client responded in such a way that she endorsed multiple symptoms taht could be ADHD.  When these responses were compared to the DSM-5-TR criteria for PTSD, the " client did not meet the full criteria (endorsed one significant symptoms in Criteria D and needed two).  The client was not endorsed with PTSD at this time.     Taken together, the client's [place] Questionnaires, CNS VS, MMPI-3, [place] Sleepiness Scale (ESS) SF-8), The PTSD Checklist (PCL-5) SF-20, and interviews, support a diagnosis of ADHD, as well as, Unspecified Anxiety Disorder.    Diagnosis:   Diagnostic Criteria:   Generalized Anxiety Disorder  A. Excessive anxiety and worry about a number of events or activities (such as work or school performance).   B. The person finds it difficult to control the worry.  C. Select 3 or more symptoms (required for diagnosis). Only one item is required in children.   - Restlessness or feeling keyed up or on edge.    - Being easily fatigued.    - Difficulty concentrating or mind going blank.    - Irritability.    - Sleep disturbance (difficulty falling or staying asleep, or restless unsatisfying sleep).   D. The focus of the anxiety and worry is not confined to features of an Axis I disorder.  E. The anxiety, worry, or physical symptoms cause clinically significant distress or impairment in social, occupational, or other important areas of functioning.   F. The disturbance is not due to the direct physiological effects of a substance (e.g., a drug of abuse, a medication) or a general medical condition (e.g., hyperthyroidism) and does not occur exclusively during a Mood Disorder, a Psychotic Disorder, or a Pervasive Developmental Disorder.    - The aformentioned symptoms began 25 year(s) ago and occurs 2 days per week and is experienced as moderate. Major Depressive Disorder  CRITERIA (A-C) REPRESENT A MAJOR DEPRESSIVE EPISODE - SELECT THESE CRITERIA  A) Recurrent episode(s) - symptoms have been present during the same 2-week period and represent a change from previous functioning 5 or more symptoms (required for diagnosis)   - Depressed mood. Note: In children and  adolescents, can be irritable mood.     - Diminished interest or pleasure in all, or almost all, activities.    - Increased sleep.    - Psychomotor activity retardation.    - Diminished ability to think or concentrate, or indecisiveness.   B) The symptoms cause clinically significant distress or impairment in social, occupational, or other important areas of functioning  C) The episode is not attributable to the physiological effects of a substance or to another medical condition  D) The occurence of major depressive episode is not better explained by other thought / psychotic disorders  E) There has never been a manic episode or hypomanic episode Attention Deficit Hyperactivity Disorder  A) A persistent pattern of inattention and/or hyperactivity-impulsivity that interferes with functioning or development, as characterized by (1) Inattention and/or (2) Hyperactivity and Impulsivity  (1) Inattention: 6 or more of the following symptoms have persisted for at least 6 months to a degree that is inconsistent with developmental level and that negatively impacts directly on social and academic/occupational activities:  - Often fails to give close attention to details or makes careless mistakes in schoolwork, at work, or during other activities  - Often has difficulty sustaining attention in tasks or play activities  - Often has difficulty organizing tasks and activities  - Often avoids, dislikes, or is reluctant to engage in tasks that require sustained mental effort  - Is often easily distractedby extraneous stimuli  - Is often forgetful in daily activities  (2) Hyeractivity and Impulsivity: 6 or more of the following symptoms have persisted for at least 6 months to a degree that is inconsistent with developmental level and that negatively impacts directly on social and academic/occupational activities:  - Often fidgets with or taps hands or feet or squirms in seat  - Often blurts out an answer before a question has been  "completed  - Often has difficulty waiting his or her turn  - Often interrupts or intrudes on others  B) Several inattentive or hyperactive-impulsive symptoms were present prior to age 12 years  C) Several inattentive or hyperactive-impulsive symptoms are present in two or more settings  D) There is clear evidence that the symptoms interfere with, or reduce the quality of, social academic, or occupational functioning  E) The Symptoms do not occur exclusively during the course of schizophrenia or another psychotic disorder and are not better explained by another mental disorder      Recommendations:    Schedule an appointment with your physician to discuss a medication evaluation., Access resources through websites, books, and articles such as those provided  in the handout., Consider working with an ADHD  or individual therapist to learn skills to  assist with symptom management, as well as ways to improve relationships,  etc that may have been impacted by your symptoms. , Consider attending workshops or support groups through [employer] ([employer])., Schedule a follow-up appointment with me in about six weeks to review symptoms, treatment involvement, and struggles and/or successes., and client would likely benefit from seeing an individual therapist to help learn skills to manage anxiety, depression, and ADHD. They would also benefit from learning emotional regulation skills, including being able to experience and talk about their emotional state (Client acknowledged, when talking about depression in particular, \"It's hard to talk about emotions.  I don't want to be vulnerable.\"       Online Self-help Websites for ADHD Symptoms:  Add.org  Issac.org  Eglx5dslx.org  Russellbarkley.org  additudemag.com        PLAN OF CARE:  Discuss the following with your primary care provider:  Consider a trial of a stimulant medication. This may help alleviate some of the patient's attentional symptoms.   Consider a " trial of a psychotropic medication. This may help alleviate some of the patient's depression and anxiety symptoms.      Consider initiating individual psychotherapy to help alleviate mood symptoms. Research indicates that outcomes are best with both medication and therapy. You can call the [facility] Access line at [phone]. NÉSTOR Osman is recommended specifically because she works with individuals with ADHD.     RECOMMENDATIONS:  Due to the patient's reported attention, concentration, and mood difficulties, the following health/lifestyle changes when combined, can significantly improve symptoms:   Avoid simple carbohydrates at breakfast. Aim for only complex carbohydrates and lean protein for your morning meal.   Engage in aerobic exercise 3 times per week for 30 minutes, ensuring that your heart rate stays within your training zone. Further, reading the book,  Spark,  by Bill Erwin M.D can help the patient understand the benefits of exercise on the brain.   Research suggest that taking a high-quality multi-vitamin and antioxidant (1/2 cup of blueberries) daily in conjunction with balanced nutrition can be helpful.  Aim for the high end of daily water intake: around 72 ounces per day.  Ensure regular meals and snacks to maintain optimal attention.     The following may be beneficial in managing some of the patient's attention and concentration difficulties:  Due to the patient's difficulties with attention and concentration, consider working in a completely distraction-free area while completing tasks. Workspaces should be completely clear except for the materials needed for the current task. Both visual and auditory distractions should be decreased as much as possible.  Considering decreased ability to focus and maintain attention, it is recommended that the patient take frequent breaks while completing tasks. This will help to maintain attention and effort. The patient may benefit  from the use of a Cognea Timer. The timer works by using built-in break times. After working on a task consistently for 25 minutes, the timer reminds the user to take a five-minute break before continuing, etc. A Cognea timer can be downloaded as a free bernadette to a phone or tablet.  Due to the patient's attentional and concentration symptoms, it is recommended to increase organization with the use of lists and calendars. Significantly increasing structure to the day and adhering to a set schedule can increase your ability to complete responsibilities, track deadline, etc. Breaking these tasks down into their component parts and recording them in a calendar/planner will likely be beneficial. Patient would benefit from setting feasible timelines for completion of activities. By establishing clear priorities for completing tasks, you can more likely complete the most important tasks first. The patient may also choose to elect to a friend or family member to help hold them accountable.     Avoid multitasking. Attempting to work on multiple tasks and projects the same increases the likelihood that an error will occur. Focus on one task at a time.     Due to the patient's reported difficulties with attention, it is recommended to consistently take thorough notes in classes where it is warranted. The patient may consider using a smartpen to take these notes. A smartpen is able to capture audio and transfer written notes into a digital document. You can learn more about smartpens at www.Genalyte.[employer].     Due to the patient's difficulties with sleep, it recommended to engage in a relaxing activity up to the point of sleep. The patient may want to spend time reading, drawing/ coloring, practicing mindfulness, listening (not watching) to podcasts, music, etc., or try the NurseGrid bernadette, which is free to download and may aid falling asleep more easily.     The patient may benefit from engaging in mindfulness practices.  "This may include breathing techniques, apps that provide guided meditation, or more interactive activities such as coloring.     Develop a \"coping skills jar/box.\" This entails designating a certain container to hold slips of paper with distraction technique ideas written on each slip of paper. Distraction techniques may include listening to a certain type of music, playing on game on your phone, doing a breathing exercise, spending time with a pet, calling a certain individual, looking at a magazine, working on a puzzle, etc. When feeling distressed, choose a slip of paper from the container and engage in that activity rather than focusing on the problem.     Patient may need to negotiate with their employer for more frequent breaks or be allowed to move around more than is typical.     Processing Speed:  Keep things the same: Establish clear routines and schedule to help increase speed of doing things.  The more automatic or routine something is, the more efficient you can be.  Slow down talk:  Be aware, and make others aware, not to provide too much information too fast.  Take one topic at a time.  Be aware of the time:  Be aware of the time to help with time management and not taking too long on any one task.  Use both visual and verbal cues:  Use both verbal and visual cues to help process information faster (write things down to help process).        Time Management:  Reduce distractions by turning off the phone and email alerts and move to a quieter environment.  Use daily goal-setting to keep priorities at the front of the mind.  Limit the number of balls in the air at one time.  Learn to not start something before finishing something else.  Use a reminder (beeper, alarm, vibrating alarm) to remind you to consider if what you are currently doing is what you should be doing.  Use social pressure positively.  Make a commitment to someone else to get a certain number of things done in a given time period.  Work " "with someone else present - even if they are not directly involved in the process (their presence can be a constant reminder to stay on task).        Distractibility:  Use reminders to keep on task.  Work to get into the habit of frequently asking yourself, \"What should I be doing now?\"  Cue yourself with the use of alarms.  Work in a quiet and less visually stimulating environment or use a sound machine or fan to provide white noise to screen out other sounds.  Noise canceling headphones would also work.  Try to keep an orderly work space or, at a minimum, clear some of the mess to one side to begin and schedule a later time to deal with the organizing.  When you find yourself off tangent, to back to the original task and finish that before moving on to the next.  If you have times of hyperfocusing, use it to your advantage by immersing yourself into a project and completing at least a big chunk of it.  Break work sessions into smaller segments with short breaks in between to reduce wandering attention or to prevent crashing and abandoning the work altogether.  Make sure to set an alarm to limit the break and getting back to work.  Find the right balance between enough stimulus in your environment to not get bored but not so much stimuli that you feel overwhelmed.  Set aside specific interruption free periods in your day for tasks that require extra focus (turn off phone, email alerts, closing open browsers, etc...).         Hyperfocus:  Try to catch hyperfocusing before it happens.     Set an alarm before starting a project and starting to hyperfocus as a reminder.  Enlist others to provide reminders, if they are willing.  Schedule some free time (on the weekends) to wander and do whatever strikes your fancy.  If you catch yourself hyperfocusing, stop and reassess and figure out what the best course of action would be (continue on, change focus).         Addressing Inattention:  Reduce extraneous stimuli:  Work to " reduce clutter, noise, visual stimuli, or reminders of tasks that shouldn't be engaged in.  Amplify relevant stimuli:  For example, putting up a whiteboard in an easy-to-see place and writing reminder notes on it; not allowing what's important to do to fade into the background.  Decrease the strength of undesirable stimuli and increasing the strength of the desired ones.  Increase external aids (to help with maintaining a higher level of functioning):  Use of supplemental aids to help bolster areas of weakness:  Calendars (wall or desk calendar can help)  Planners  Clocks, Alarms, and Reminders  Use of Digital Voice Recorders (even on Smart phone)        Kwaku Mina PsyD    Psychological Testing  Sample Billing/Services Summary       Testing Evaluation Services Base: 80580  (1st 60 mins) Add-on: 08854  (each addtl 60 mins)   Record Review and Clarify Referral Question   (Start/Stop), (Date, Day #) 0 minutes   Intra-Session Clinical Decision Making   (Start/Stop), (Date, Day #) 0 minutes   Patient Symptom Management   (Start/Stop), (Date, Day #) 0 minutes   Clinical Decision Making/Battery Modification   (Start/Stop), (Date, Day #) 0 minutes   Integration/Report Generation   (6:30pm/7:33pm), (5/19/24, Day #1);  (9:00am/9:23am), (5/20/24, Day #2);  (9:00pm/9:47pm), (5/21/24, Day #3);  (3:30pm/4:45pm), (5/25/24, Day #5) 208 minutes   Interactive Feedback Session  (11:01am/12:08pm), (5/23/24, Day #4) 67 minutes   Post-Service Work   (Start/Stop), (Date, Day #) 0 minutes   Total Time: 275 minutes (4 hours, 35 minutes)   Total Units: 1 4       Test Administration and Scoring Base: 48233  (1st 30 mins) Add-on: 71027  (each addtl 30 mins)   Test Administration (Face-to-Face)  (Start/Stop); (Start/Stop), (Date, Day #) 0 minutes   Scoring (Non-Face-to-Face)   (Start/Stop), (Date, Day #) 0 minutes   Total Time: 0 minutes (0 hours, 0 minutes)   Total Units: 0 0       Diagnosis(es): (ICD-10)  F90.2   Attention-Deficit/Hyperactivity Disorder, Combined presentation.  F41.1  Generalized Anxiety Disorder.  F33.41 Major Depressive Disorder, Recurrent, In partial remission.  F10.21 Alcohol Use Disorder, Moderate, In early remission (by Hx).  F32.81 (Rule-in) Premenstrual dysphoric disorder

## 2024-05-23 ENCOUNTER — VIRTUAL VISIT (OUTPATIENT)
Dept: PSYCHOLOGY | Facility: CLINIC | Age: 44
End: 2024-05-23
Payer: COMMERCIAL

## 2024-05-23 DIAGNOSIS — F90.2 ATTENTION DEFICIT HYPERACTIVITY DISORDER, COMBINED TYPE: ICD-10-CM

## 2024-05-23 DIAGNOSIS — F41.1 GENERALIZED ANXIETY DISORDER: Primary | ICD-10-CM

## 2024-05-23 DIAGNOSIS — F10.21 MODERATE ALCOHOL USE DISORDER, IN EARLY REMISSION (H): ICD-10-CM

## 2024-05-23 DIAGNOSIS — F33.41 MAJOR DEPRESSIVE DISORDER, RECURRENT EPISODE, IN PARTIAL REMISSION (H): ICD-10-CM

## 2024-05-23 DIAGNOSIS — F32.81 PREMENSTRUAL DYSPHORIC DISORDER: ICD-10-CM

## 2024-05-23 PROCEDURE — 96130 PSYCL TST EVAL PHYS/QHP 1ST: CPT | Mod: 95 | Performed by: PSYCHOLOGIST

## 2024-05-23 PROCEDURE — 96131 PSYCL TST EVAL PHYS/QHP EA: CPT | Mod: 95 | Performed by: PSYCHOLOGIST

## 2024-08-12 ENCOUNTER — OFFICE VISIT (OUTPATIENT)
Dept: FAMILY MEDICINE | Facility: CLINIC | Age: 44
End: 2024-08-12
Payer: COMMERCIAL

## 2024-08-12 VITALS
OXYGEN SATURATION: 97 % | DIASTOLIC BLOOD PRESSURE: 98 MMHG | HEART RATE: 79 BPM | RESPIRATION RATE: 16 BRPM | TEMPERATURE: 97.4 F | SYSTOLIC BLOOD PRESSURE: 158 MMHG | BODY MASS INDEX: 30.21 KG/M2 | WEIGHT: 204 LBS | HEIGHT: 69 IN

## 2024-08-12 DIAGNOSIS — F41.9 ANXIETY: ICD-10-CM

## 2024-08-12 DIAGNOSIS — Z00.00 ROUTINE GENERAL MEDICAL EXAMINATION AT A HEALTH CARE FACILITY: Primary | ICD-10-CM

## 2024-08-12 DIAGNOSIS — F90.2 ATTENTION DEFICIT HYPERACTIVITY DISORDER (ADHD), COMBINED TYPE: ICD-10-CM

## 2024-08-12 DIAGNOSIS — Z12.31 VISIT FOR SCREENING MAMMOGRAM: ICD-10-CM

## 2024-08-12 DIAGNOSIS — R03.0 ELEVATED BP WITHOUT DIAGNOSIS OF HYPERTENSION: ICD-10-CM

## 2024-08-12 PROBLEM — Z48.02 VISIT FOR SUTURE REMOVAL: Status: RESOLVED | Noted: 2023-11-28 | Resolved: 2024-08-12

## 2024-08-12 PROCEDURE — 99214 OFFICE O/P EST MOD 30 MIN: CPT | Mod: 25 | Performed by: NURSE PRACTITIONER

## 2024-08-12 PROCEDURE — 99396 PREV VISIT EST AGE 40-64: CPT | Performed by: NURSE PRACTITIONER

## 2024-08-12 RX ORDER — HYDROXYZINE HYDROCHLORIDE 25 MG/1
25 TABLET, FILM COATED ORAL 3 TIMES DAILY PRN
Qty: 30 TABLET | Refills: 1 | Status: SHIPPED | OUTPATIENT
Start: 2024-08-12

## 2024-08-12 RX ORDER — ESCITALOPRAM OXALATE 10 MG/1
15 TABLET ORAL DAILY
Qty: 135 TABLET | Refills: 0 | OUTPATIENT
Start: 2024-08-12

## 2024-08-12 RX ORDER — ESCITALOPRAM OXALATE 10 MG/1
15 TABLET ORAL DAILY
Qty: 135 TABLET | Refills: 3 | Status: SHIPPED | OUTPATIENT
Start: 2024-08-12

## 2024-08-12 SDOH — HEALTH STABILITY: PHYSICAL HEALTH: ON AVERAGE, HOW MANY MINUTES DO YOU ENGAGE IN EXERCISE AT THIS LEVEL?: 30 MIN

## 2024-08-12 SDOH — HEALTH STABILITY: PHYSICAL HEALTH: ON AVERAGE, HOW MANY DAYS PER WEEK DO YOU ENGAGE IN MODERATE TO STRENUOUS EXERCISE (LIKE A BRISK WALK)?: 3 DAYS

## 2024-08-12 ASSESSMENT — ANXIETY QUESTIONNAIRES
2. NOT BEING ABLE TO STOP OR CONTROL WORRYING: NOT AT ALL
GAD7 TOTAL SCORE: 3
6. BECOMING EASILY ANNOYED OR IRRITABLE: MORE THAN HALF THE DAYS
GAD7 TOTAL SCORE: 3
IF YOU CHECKED OFF ANY PROBLEMS ON THIS QUESTIONNAIRE, HOW DIFFICULT HAVE THESE PROBLEMS MADE IT FOR YOU TO DO YOUR WORK, TAKE CARE OF THINGS AT HOME, OR GET ALONG WITH OTHER PEOPLE: NOT DIFFICULT AT ALL
5. BEING SO RESTLESS THAT IT IS HARD TO SIT STILL: SEVERAL DAYS
GAD7 TOTAL SCORE: 3
7. FEELING AFRAID AS IF SOMETHING AWFUL MIGHT HAPPEN: NOT AT ALL
1. FEELING NERVOUS, ANXIOUS, OR ON EDGE: NOT AT ALL
4. TROUBLE RELAXING: NOT AT ALL
3. WORRYING TOO MUCH ABOUT DIFFERENT THINGS: NOT AT ALL
7. FEELING AFRAID AS IF SOMETHING AWFUL MIGHT HAPPEN: NOT AT ALL
8. IF YOU CHECKED OFF ANY PROBLEMS, HOW DIFFICULT HAVE THESE MADE IT FOR YOU TO DO YOUR WORK, TAKE CARE OF THINGS AT HOME, OR GET ALONG WITH OTHER PEOPLE?: NOT DIFFICULT AT ALL

## 2024-08-12 ASSESSMENT — SOCIAL DETERMINANTS OF HEALTH (SDOH): HOW OFTEN DO YOU GET TOGETHER WITH FRIENDS OR RELATIVES?: ONCE A WEEK

## 2024-08-12 ASSESSMENT — PATIENT HEALTH QUESTIONNAIRE - PHQ9
10. IF YOU CHECKED OFF ANY PROBLEMS, HOW DIFFICULT HAVE THESE PROBLEMS MADE IT FOR YOU TO DO YOUR WORK, TAKE CARE OF THINGS AT HOME, OR GET ALONG WITH OTHER PEOPLE: NOT DIFFICULT AT ALL
SUM OF ALL RESPONSES TO PHQ QUESTIONS 1-9: 6
SUM OF ALL RESPONSES TO PHQ QUESTIONS 1-9: 6

## 2024-08-12 ASSESSMENT — PAIN SCALES - GENERAL: PAINLEVEL: NO PAIN (0)

## 2024-08-12 NOTE — LETTER
My Depression Action Plan  Name: Mary Ramirez   Date of Birth 1980  Date: 8/12/2024    My doctor: Ekta Coello   My clinic: [facility]  [address] [address]  [phone]            GREEN    ZONE   Good Control    What it looks like:   Things are going generally well. You have normal ups and downs. You may even feel depressed from time to time, but bad moods usually last less than a day.   What you need to do:  Continue to care for yourself (see self care plan)  Check your depression survival kit and update it as needed  Follow your physician s recommendations including any medication.  Do not stop taking medication unless you consult with your physician first.             YELLOW         ZONE Getting Worse    What it looks like:   Depression is starting to interfere with your life.   It may be hard to get out of bed; you may be starting to isolate yourself from others.  Symptoms of depression are starting to last most all day and this has happened for several days.   You may have suicidal thoughts but they are not constant.   What you need to do:     Call your care team. Your response to treatment will improve if you keep your care team informed of your progress. Yellow periods are signs an adjustment may need to be made.     Continue your self-care.  Just get dressed and ready for the day.  Don't give yourself time to talk yourself out of it.    Talk to someone in your support network.    Open up your Depression Self-Care Plan/Wellness Kit.             RED    ZONE Medical Alert - Get Help    What it looks like:   Depression is seriously interfering with your life.   You may experience these or other symptoms: You can t get out of bed most days, can t work or engage in other necessary activities, you have trouble taking care of basic hygiene, or basic responsibilities, thoughts of suicide or death that will not go away, self-injurious  behavior.     What you need to do:  Call your care team and request a same-day appointment. If they are not available (weekends or after hours) call your local crisis line, emergency room or 911.          Depression Self-Care Plan / Wellness Kit    Many people find that medication and therapy are helpful treatments for managing depression. In addition, making small changes to your everyday life can help to boost your mood and improve your wellbeing. Below are some tips for you to consider. Be sure to talk with your medical provider and/or behavioral health consultant if your symptoms are worsening or not improving.     Sleep   Sleep hygiene  means all of the habits that support good, restful sleep. It includes maintaining a consistent bedtime and wake time, using your bedroom only for sleeping or sex, and keeping the bedroom dark and free of distractions like a computer, smartphone, or television.     Develop a Healthy Routine  Maintain good hygiene. Get out of bed in the morning, make your bed, brush your teeth, take a shower, and get dressed. Don t spend too much time viewing media that makes you feel stressed. Find time to relax each day.    Exercise  Get some form of exercise every day. This will help reduce pain and release endorphins, the  feel good  chemicals in your brain. It can be as simple as just going for a walk or doing some gardening, anything that will get you moving.      Diet  Strive to eat healthy foods, including fruits and vegetables. Drink plenty of water. Avoid excessive sugar, caffeine, alcohol, and other mood-altering substances.     Stay Connected with Others  Stay in touch with friends and family members.    Manage Your Mood  Try deep breathing, massage therapy, biofeedback, or meditation. Take part in fun activities when you can. Try to find something to smile about each day.     Psychotherapy  Be open to working with a therapist if your provider recommends it.     Medication  Be sure to  take your medication as prescribed. Most anti-depressants need to be taken every day. It usually takes several weeks for medications to work. Not all medicines work for all people. It is important to follow-up with your provider to make sure you have a treatment plan that is working for you. Do not stop your medication abruptly without first discussing it with your provider.    Crisis Resources   These hotlines are for both adults and children. They and are open 24 hours a day, 7 days a week unless noted otherwise.    National Suicide Prevention Lifeline   988 or 4-252-609-AFNR (1762)    Crisis Text Line    www.crisistextline.org  Text HOME to 991759 from anywhere in the [place], anytime, about any type of crisis. A live, trained crisis counselor will receive the text and respond quickly.    [place] Lifeline for LGBTQ Youth  A national crisis intervention and suicide lifeline for LGBTQ youth under 25. Provides a safe place to talk without judgement. Call 1-132.920.4045; text START to 323970 or visit www.thetrevorproject.org to talk to a trained counselor.    For [facility] crisis numbers, visit the [facility] website at:  https://[place].gov/dhs/people-we-serve/adults/health-care/mental-health/resources/crisis-contacts.jsp

## 2024-08-12 NOTE — PATIENT INSTRUCTIONS
Check BP at home over the next week daily in the morning.  If elevated over 140/90 consistently, make an appointment with your PCP to discuss elevated blood pressures.    Patient Education   Preventive Care Advice   This is general advice given by our system to help you stay healthy. However, your care team may have specific advice just for you. Please talk to your care team about your preventive care needs.  Nutrition  Eat 5 or more servings of fruits and vegetables each day.  Try wheat bread, brown rice and whole grain pasta (instead of white bread, rice, and pasta).  Get enough calcium 1200 mg and vitamin D [id number] international unit(s) daily. Check the label on foods and aim for 100% of the RDA (recommended daily allowance).  Lifestyle  Exercise at least 150 minutes each week  (30 minutes a day, 5 days a week).  Do muscle strengthening activities 2 days a week. These help control your weight and prevent disease.  No smoking.  Wear sunscreen to prevent skin cancer.  Have a dental exam and cleaning every 6 months.  Have an eye exam at least every other year where they dilate your eyes.  Yearly exams  See your health care team every year to talk about:  Any changes in your health.  Any medicines your care team has prescribed.  Preventive care, family planning, and ways to prevent chronic diseases.  Shots (vaccines)   HPV shots (up to age 26), if you've never had them before.  Hepatitis B shots (up to age 59), if you've never had them before.  COVID-19 shot: Get this shot when it's due.  Flu shot: Get a flu shot every year.  Tetanus shot: Get a tetanus shot every 10 years.  Pneumococcal, hepatitis A, and RSV shots: Ask your care team if you need these based on your risk.  Shingles shot (for age 50 and up)  General health tests  Diabetes screening:  Starting at age 35, Get screened for diabetes at least every 3 years.  If you are younger than age 35, ask your care team if you should be screened for  diabetes.  Cholesterol test: At age 39, start having a cholesterol test every 5 years, or more often if advised.  Bone density scan (DEXA): At age 50, ask your care team if you should have this scan for osteoporosis (brittle bones).  Hepatitis C: Get tested at least once in your life.  STIs (sexually transmitted infections)  Before age 24: Ask your care team if you should be screened for STIs.  After age 24: Get screened for STIs if you're at risk. You are at risk for STIs (including HIV) if:  You are sexually active with more than one person.  You don't use condoms every time.  You or a partner was diagnosed with a sexually transmitted infection.  If you are at risk for HIV, ask about PrEP medicine to prevent HIV.  Get tested for HIV at least once in your life, whether you are at risk for HIV or not.  Cancer screening tests  Cervical cancer screening: If you have a cervix, begin getting regular cervical cancer screening tests starting at age 21.  Breast cancer scan (mammogram): If you've ever had breasts, begin having regular mammograms starting at age 40. This is a scan to check for breast cancer.  Colon cancer screening: It is important to start screening for colon cancer at age 45.  Have a colonoscopy test every 10 years (or more often if you're at risk) Or, ask your provider about stool tests like a FIT test every year or Cologuard test every 3 years.  To learn more about your testing options, visit:   .  For help making a decision, visit:   https://bit.ly/gl14850.  Prostate cancer screening test: If you have a prostate, ask your care team if a prostate cancer screening test (PSA) at age 55 is right for you.  Lung cancer screening: If you are a current or former smoker ages 50 to 80, ask your care team if ongoing lung cancer screenings are right for you.  For informational purposes only. Not to replace the advice of your health care provider. Copyright   2023 [employer]. All rights reserved.  Clinically reviewed by the [facility] Transitions Program. [employer]tag [id number] - REV 01/24.

## 2024-08-12 NOTE — PROGRESS NOTES
Preventive Care Visit  [facility]  Shayla Carvajal NP, Family Medicine  Aug 12, 2024    Assessment & Plan     Routine general medical examination at a health care facility  Ordered mammogram for screening.  Patient is up-to-date on all other screenings at this time.  Clinical history addressed below.  Reviewed preventive health recommendations and advised follow-up in 1 year for annual physical exam.  - REVIEW OF HEALTH MAINTENANCE PROTOCOL ORDERS    Visit for screening mammogram  - MA Screening Bilateral w/ Reji; Future    Attention deficit hyperactivity disorder (ADHD), combined type  Patient states that she was recently diagnosed with ADHD but is currently under supervision of psychiatry for treatment and has appointment for medication management.    Anxiety  Patient's anxiety is stable on Lexapro 15 mg and hydroxyzine as needed.  I have discontinued her Ativan since she has not really used this.  I will refill the Lexapro for 1 year and the Atarax for as needed use.  I have advised patient if she needs refills on the Atarax to put them into her pharmacy and I would okay them for the next year.  - escitalopram (LEXAPRO) 10 MG tablet; Take 1.5 tablets (15 mg) by mouth daily  - hydrOXYzine HCl (ATARAX) 25 MG tablet; Take 1 tablet (25 mg) by mouth 3 times daily as needed for anxiety    Elevated BP without diagnosis of hypertension  Patient was late for her appointment and her blood pressure was elevated.  I reviewed her vital sign flowsheet which notably she has elevated blood pressure when she is in the clinic.  I have advised her to take her blood pressure at home for a week and if elevated consistently over 140/90, she should follow-up with her primary care doctor to discuss possible treatment for hypertension.  All other vitals are stable.    Patient has been advised of split billing requirements and indicates understanding: Yes        Nicotine/Tobacco Cessation  She reports that  "she has been smoking cigarettes. She started smoking about 31 years ago. She has a 1 pack-year smoking history. She has never used smokeless tobacco.  Nicotine/Tobacco Cessation Plan  Information offered: Patient not interested at this time      BMI  Estimated body mass index is 30.57 kg/m  as calculated from the following:    Height as of this encounter: 1.74 m (5' 8.5\").    Weight as of this encounter: 92.5 kg (204 lb).   Weight management plan: Discussed healthy diet and exercise guidelines    Counseling  Appropriate preventive services were addressed with this patient via screening, questionnaire, or discussion as appropriate for fall prevention, nutrition, physical activity, Tobacco-use cessation, weight loss and cognition.  Checklist reviewing preventive services available has been given to the patient.  Reviewed patient's diet, addressing concerns and/or questions.   She is at risk for lack of exercise and has been provided with information to increase physical activity for the benefit of her well-being.   The patient's PHQ-9 score is consistent with mild depression. She was provided with information regarding depression.       See Patient Instructions    Ximena Hernandez is a 43 year old, presenting for the following:  Physical        8/12/2024     4:29 PM   Additional Questions   Roomed by RMB   Accompanied by self         8/12/2024     4:29 PM   Patient Reported Additional Medications   Patient reports taking the following new medications none        Health Care Directive  Patient does not have a Health Care Directive or Living Will: Discussed advance care planning with patient; information given to patient to review.    HPI    Depression and Anxiety   How are you doing with your depression since your last visit? improving  How are you doing with your anxiety since your last visit?  Improved   Are you having other symptoms that might be associated with depression or anxiety? No  Have you had a " significant life event? No   Do you have any concerns with your use of alcohol or other drugs? No    Social History     Tobacco Use    Smoking status: Some Days     Current packs/day: 0.00     Average packs/day: 0.1 packs/day for 10.0 years (1.0 ttl pk-yrs)     Types: Cigarettes     Start date: 1993     Last attempt to quit: 2003     Years since quittin.1    Smokeless tobacco: Never   Vaping Use    Vaping status: Never Used   Substance Use Topics    Alcohol use: Yes     Comment: rare    Drug use: No         2022     9:39 AM 3/20/2024     7:54 AM 2024     4:31 PM   PHQ   PHQ-9 Total Score 17 5 6   Q9: Thoughts of better off dead/self-harm past 2 weeks Not at all Not at all Not at all         2022     9:42 AM 3/20/2024     1:22 PM 2024     4:32 PM   HAL-7 SCORE   Total Score 17 (severe anxiety)  3 (minimal anxiety)   Total Score 17 4 3         2024     4:31 PM   Last PHQ-9   1.  Little interest or pleasure in doing things 0   2.  Feeling down, depressed, or hopeless 0   3.  Trouble falling or staying asleep, or sleeping too much 0   4.  Feeling tired or having little energy 0   5.  Poor appetite or overeating 2   6.  Feeling bad about yourself 0   7.  Trouble concentrating 2   8.  Moving slowly or restless 2   Q9: Thoughts of better off dead/self-harm past 2 weeks 0   PHQ-9 Total Score 6         2024     4:32 PM   HAL-7    1. Feeling nervous, anxious, or on edge 0   2. Not being able to stop or control worrying 0   3. Worrying too much about different things 0   4. Trouble relaxing 0   5. Being so restless that it is hard to sit still 1   6. Becoming easily annoyed or irritable 2   7. Feeling afraid, as if something awful might happen 0   HAL-7 Total Score 3   If you checked any problems, how difficult have they made it for you to do your work, take care of things at home, or get along with other people? Not difficult at all           2024   General Health   How would  you rate your overall physical health? (!) FAIR   Feel stress (tense, anxious, or unable to sleep) Not at all            2024   Nutrition   Three or more servings of calcium each day? Yes   Diet: Regular (no restrictions)   How many servings of fruit and vegetables per day? (!) 0-1   How many sweetened beverages each day? 0-1            2024   Exercise   Days per week of moderate/strenous exercise 3 days   Average minutes spent exercising at this level 30 min            2024   Social Factors   Frequency of gathering with friends or relatives Once a week   Worry food won't last until get money to buy more No   Food not last or not have enough money for food? No   Do you have housing? (Housing is defined as stable permanent housing and does not include staying ouside in a car, in a tent, in an abandoned building, in an overnight shelter, or couch-surfing.) Yes   Are you worried about losing your housing? No   Lack of transportation? No   Unable to get utilities (heat,electricity)? No            2024   Dental   Dentist two times every year? Yes            2024   TB Screening   Were you born outside of the [place]? No        Today's PHQ-9 Score:       2024     4:31 PM   PHQ-9 SCORE   PHQ-9 Total Score MyChart 6 (Mild depression)   PHQ-9 Total Score 6         2024   Substance Use   Alcohol more than 3/day or more than 7/wk No   Do you use any other substances recreationally? (!) CANNABIS PRODUCTS        Social History     Tobacco Use    Smoking status: Some Days     Current packs/day: 0.00     Average packs/day: 0.1 packs/day for 10.0 years (1.0 ttl pk-yrs)     Types: Cigarettes     Start date: 1993     Last attempt to quit: 2003     Years since quittin.1    Smokeless tobacco: Never   Vaping Use    Vaping status: Never Used   Substance Use Topics    Alcohol use: Yes     Comment: rare    Drug use: No           2/10/2023   LAST FHS-7 RESULTS   1st degree relative breast or  ovarian cancer No   Any relative bilateral breast cancer Unknown   Any male have breast cancer No   Any ONE woman have BOTH breast AND ovarian cancer Yes   Any woman with breast cancer before 50yrs Unknown   2 or more relatives with breast AND/OR ovarian cancer No   2 or more relatives with breast AND/OR bowel cancer No      Mammogram Screening - Mammogram every 1-2 years updated in Health Maintenance based on mutual decision making        8/12/2024   STI Screening   New sexual partner(s) since last STI/HIV test? No        History of abnormal Pap smear: No - age 30- 64 PAP with HPV every 5 years recommended        Latest Ref Rng & Units 2/10/2023     8:45 AM 9/13/2007    12:00 AM 4/5/2006    12:00 AM   PAP / HPV   PAP  Negative for Intraepithelial Lesion or Malignancy (NILM)      PAP (Historical)   NIL  NIL    HPV 16 DNA Negative Negative      HPV 18 DNA Negative Negative      Other HR HPV Negative Negative        ASCVD Risk   The 10-year ASCVD risk score (Juanito SIDDIQUI, et al., 2019) is: 3.9%    Values used to calculate the score:      Age: 43 years      Sex: Female      Is Non- : No      Diabetic: No      Tobacco smoker: Yes      Systolic Blood Pressure: 158 mmHg      Is BP treated: No      HDL Cholesterol: 58 mg/dL      Total Cholesterol: 216 mg/dL        8/12/2024   Contraception/Family Planning   Questions about contraception or family planning No      Reviewed and updated as needed this visit by Provider   Tobacco  Allergies  Meds  Problems  Med Hx  Surg Hx  Fam Hx  Soc   Hx Sexual Activity          Past Medical History:   Diagnosis Date    Depressive disorder, not elsewhere classified     Took Paxil for a short time    HAL (generalized anxiety disorder)     Irritable bowel syndrome     Pyelonephritis, unspecified age 14     Past Surgical History:   Procedure Laterality Date    APPENDECTOMY      CHOLECYSTECTOMY, LAPOROSCOPIC      Cholecystectomy, Laparoscopic    FRACTURE  TX, WRIST RT/LT  2003    Left wrist, closed reduction     Lab work is in process  Labs reviewed in EPIC  BP Readings from Last 3 Encounters:   24 (!) 158/98   23 (!) 169/94   23 (!) 141/94    Wt Readings from Last 3 Encounters:   24 92.5 kg (204 lb)   23 86.6 kg (191 lb)   23 89.4 kg (197 lb)                  Patient Active Problem List   Diagnosis    CARDIOVASCULAR SCREENING; LDL GOAL LESS THAN 160    Nevus, non-neoplastic    Anxiety state    Heartburn    Attention deficit hyperactivity disorder (ADHD), combined type     Past Surgical History:   Procedure Laterality Date    APPENDECTOMY      CHOLECYSTECTOMY, LAPOROSCOPIC      Cholecystectomy, Laparoscopic    FRACTURE TX, WRIST RT/LT  2003    Left wrist, closed reduction       Social History     Tobacco Use    Smoking status: Some Days     Current packs/day: 0.00     Average packs/day: 0.1 packs/day for 10.0 years (1.0 ttl pk-yrs)     Types: Cigarettes     Start date: 1993     Last attempt to quit: 2003     Years since quittin.1    Smokeless tobacco: Never   Substance Use Topics    Alcohol use: Yes     Comment: rare     Family History   Problem Relation Age of Onset    Thyroid Disease Mother         hypo    Hypertension Father     Diabetes Brother     Breast Cancer Maternal Grandmother     C.A.D. Maternal Grandfather         CABG-in his late 50's    Diabetes Maternal Grandfather     Family History Negative Paternal Grandfather         was killed in MVA    C.A.D. Maternal Uncle         MI at age 43    Diabetes Paternal Aunt     Cerebrovascular Disease No family hx of     Cancer - colorectal No family hx of     Alcohol/Drug No family hx of          Current Outpatient Medications   Medication Sig Dispense Refill    escitalopram (LEXAPRO) 10 MG tablet Take 1.5 tablets (15 mg) by mouth daily 135 tablet 3    hydrOXYzine HCl (ATARAX) 25 MG tablet Take 1 tablet (25 mg) by mouth 3 times daily as needed for anxiety 30  "tablet 1     Allergies   Allergen Reactions    Morphine And Codeine Nausea     Recent Labs   Lab Test 09/19/23  0948 09/08/23  1814 02/10/23  0912 11/09/22  1021 08/04/22  1057   *  --  139*  --   --    HDL 58  --  54  --   --    TRIG 150*  --  81  --   --    ALT  --  35  --  30  --    CR  --  0.90  --  0.71 0.63   GFRESTIMATED  --  82  --  >90 >90   POTASSIUM  --  4.0  --  3.9 4.0   TSH  --   --   --  3.47 3.05          Review of Systems  CONSTITUTIONAL: NEGATIVE for fever, chills, change in weight  INTEGUMENTARY/SKIN: NEGATIVE for worrisome rashes, moles or lesions  EYES: NEGATIVE for vision changes or irritation  ENT/MOUTH: NEGATIVE for ear, mouth and throat problems  RESP: NEGATIVE for significant cough or SOB  BREAST: NEGATIVE for masses, tenderness or discharge  CV: NEGATIVE for chest pain, palpitations or peripheral edema  GI: NEGATIVE for nausea, abdominal pain, heartburn, or change in bowel habits  : menstrual cycles are hit or miss at times.  MUSCULOSKELETAL: NEGATIVE for significant arthralgias or myalgia  NEURO: NEGATIVE for weakness, dizziness or paresthesias  ENDOCRINE: NEGATIVE for temperature intolerance, skin/hair changes  HEME/ALLERGY/IMMUNE: NEGATIVE for bleeding problems  PSYCHIATRIC: POSITIVE forHx anxiety, Hx depression, and stable on medications     Objective    Exam  BP (!) 158/98   Pulse 79   Temp 97.4  F (36.3  C) (Tympanic)   Resp 16   Ht 1.74 m (5' 8.5\")   Wt 92.5 kg (204 lb)   LMP 07/31/2024 (Approximate)   SpO2 97%   BMI 30.57 kg/m     Estimated body mass index is 30.57 kg/m  as calculated from the following:    Height as of this encounter: 1.74 m (5' 8.5\").    Weight as of this encounter: 92.5 kg (204 lb).    Physical Exam  GENERAL: alert and no distress  EYES: Eyes grossly normal to inspection, PERRL and conjunctivae and sclerae normal  HENT: ear canals and TM's normal, nose and mouth without ulcers or lesions  NECK: no adenopathy, no asymmetry, masses, or " scars  RESP: lungs clear to auscultation - no rales, rhonchi or wheezes  CV: regular rate and rhythm, normal S1 S2, no S3 or S4, no murmur, click or rub, no peripheral edema  ABDOMEN: soft, nontender, no hepatosplenomegaly, no masses and bowel sounds normal  MS: no gross musculoskeletal defects noted, no edema  SKIN: no suspicious lesions or rashes  NEURO: Normal strength and tone, mentation intact and speech normal  PSYCH: mentation appears normal, affect normal/bright  LYMPH: normal ant/post cervical, supraclavicular nodes    Signed Electronically by: Shayla Carvajal, NP

## 2024-08-23 ENCOUNTER — VIRTUAL VISIT (OUTPATIENT)
Dept: FAMILY MEDICINE | Facility: CLINIC | Age: 44
End: 2024-08-23
Payer: COMMERCIAL

## 2024-08-23 DIAGNOSIS — F63.9 IMPULSE CONTROL DISORDER: ICD-10-CM

## 2024-08-23 DIAGNOSIS — F90.9 ATTENTION DEFICIT HYPERACTIVITY DISORDER (ADHD), UNSPECIFIED ADHD TYPE: ICD-10-CM

## 2024-08-23 DIAGNOSIS — I10 BENIGN ESSENTIAL HYPERTENSION: ICD-10-CM

## 2024-08-23 DIAGNOSIS — F41.9 ANXIETY: Primary | ICD-10-CM

## 2024-08-23 PROCEDURE — 99214 OFFICE O/P EST MOD 30 MIN: CPT | Mod: 95 | Performed by: FAMILY MEDICINE

## 2024-08-23 PROCEDURE — G2211 COMPLEX E/M VISIT ADD ON: HCPCS | Mod: 95 | Performed by: FAMILY MEDICINE

## 2024-08-23 RX ORDER — HYDROCHLOROTHIAZIDE 12.5 MG/1
12.5 TABLET ORAL DAILY
Qty: 90 TABLET | Refills: 0 | Status: SHIPPED | OUTPATIENT
Start: 2024-08-23

## 2024-08-23 RX ORDER — LORAZEPAM 0.5 MG/1
0.5 TABLET ORAL EVERY 6 HOURS PRN
Qty: 15 TABLET | Refills: 0 | Status: SHIPPED | OUTPATIENT
Start: 2024-08-23

## 2024-08-23 NOTE — PROGRESS NOTES
"Mary is a 43 year old who is being evaluated via a billable video visit.    How would you like to obtain your AVS? MyChart  If the video visit is dropped, the invitation should be resent by: Text to cell phone: [phone]  Will anyone else be joining your video visit? No      Assessment & Plan     Anxiety  Patient will continue her Lexapro.  Limited supply of Ativan sent to the pharmacy that she can use as needed.  She also takes Vistaril occasionally which is helpful.  - Adult Mental Health  Referral; Future  - LORazepam (ATIVAN) 0.5 MG tablet; Take 1 tablet (0.5 mg) by mouth every 6 hours as needed for anxiety.    Impulse control disorder  Patient notes that psychology was concerned about possible bipolar disorder.  She is not having depressive symptoms but does have impulse control issues and would like to get a formal diagnosis if this is the case.  Referral to psychiatry placed  - Adult Mental Health  Referral; Future    Attention deficit hyperactivity disorder (ADHD), unspecified ADHD type  Discussed with the patient today.  She would like to try a stimulant type medication but I would like to reach out to her psychologist prior to this to ensure that they feel as though this is appropriate.  Additionally, we need to get her blood pressure under better control before starting.  She is able to take blood pressures at home.    Benign essential hypertension  Blood pressures have been consistently elevated at home specifically in the diastolic numbers.  Will start a low-dose of hydrochlorothiazide to see if this is helpful.  She will contact me in the next week or 2 to let me know how things are going.  She will also come in for a \"nurse only\" and \"lab only\" appointments to recheck BMP.  If blood pressure is under better control and cleared by psychology we could start a low-dose Adderall.  - hydroCHLOROthiazide 12.5 MG tablet; Take 1 tablet (12.5 mg) by mouth daily.  - Basic metabolic panel "  (Ca, Cl, CO2, Creat, Gluc, K, Na, BUN); Future      The longitudinal plan of care for the diagnosis(es)/condition(s) as documented were addressed during this visit. Due to the added complexity in care, I will continue to support Mary in the subsequent management and with ongoing continuity of care.          Ximena Hernandez is a 43 year old, presenting for the following health issues:  No chief complaint on file.    FELICE Hernandez is a very pleasant 43-year-old female with a past medical history significant for anxiety/depression which is well-controlled on Lexapro who presents for a video visit to discuss ADHD.    Patient has been seeing a psychologist.  She states that she filled out some forms and her psychologist has diagnosed her with ADHD.  Her psychologist is also worried somewhat about bipolar disorder.  Patient does not get very depressed but does have some impulse control issues.  She states that she has taken out some credit cards and is in some debt now.  She notes that the impulse control mainly relates to shopping.    Additionally, patient notes that the last few visits in clinic her blood pressure is a bit a bit elevated.  She checks her blood pressures at home and generally in the low 140s over mid 90s.        Objective           Vitals:  No vitals were obtained today due to virtual visit.    Physical Exam   GENERAL: alert and no distress  EYES: Eyes grossly normal to inspection.  No discharge or erythema, or obvious scleral/conjunctival abnormalities.  RESP: No audible wheeze, cough, or visible cyanosis.    SKIN: Visible skin clear. No significant rash, abnormal pigmentation or lesions.  NEURO: Cranial nerves grossly intact.  Mentation and speech appropriate for age.  PSYCH: Appropriate affect, tone, and pace of words          Video-Visit Details    Type of service:  Video Visit   Originating Location (pt. Location): Home    Distant Location (provider location):  On-site  Platform used for  Video Visit: Doximity  Signed Electronically by: Ekta Coello MD

## 2024-10-29 ENCOUNTER — MYC REFILL (OUTPATIENT)
Dept: FAMILY MEDICINE | Facility: CLINIC | Age: 44
End: 2024-10-29
Payer: COMMERCIAL

## 2024-10-29 DIAGNOSIS — F41.9 ANXIETY: ICD-10-CM

## 2024-10-29 RX ORDER — LORAZEPAM 0.5 MG/1
0.5 TABLET ORAL EVERY 6 HOURS PRN
Qty: 15 TABLET | Refills: 0 | Status: SHIPPED | OUTPATIENT
Start: 2024-10-29

## 2024-10-29 NOTE — TELEPHONE ENCOUNTER
Requested Prescriptions   Pending Prescriptions Disp Refills    LORazepam (ATIVAN) 0.5 MG tablet 15 tablet 0     Sig: Take 1 tablet (0.5 mg) by mouth every 6 hours as needed for anxiety.       There is no refill protocol information for this order

## 2024-12-07 ENCOUNTER — HOSPITAL ENCOUNTER (EMERGENCY)
Facility: CLINIC | Age: 44
Discharge: HOME | End: 2024-12-07
Payer: COMMERCIAL

## 2024-12-07 VITALS
RESPIRATION RATE: 18 BRPM | SYSTOLIC BLOOD PRESSURE: 160 MMHG | TEMPERATURE: 98.6 F | OXYGEN SATURATION: 100 % | DIASTOLIC BLOOD PRESSURE: 101 MMHG | HEART RATE: 88 BPM

## 2024-12-07 DIAGNOSIS — H11.32 SUBCONJUNCTIVAL HEMORRHAGE OF LEFT EYE: ICD-10-CM

## 2024-12-07 PROCEDURE — G0463 HOSPITAL OUTPT CLINIC VISIT: HCPCS

## 2024-12-07 PROCEDURE — 99213 OFFICE O/P EST LOW 20 MIN: CPT

## 2024-12-07 ASSESSMENT — COLUMBIA-SUICIDE SEVERITY RATING SCALE - C-SSRS
2. HAVE YOU ACTUALLY HAD ANY THOUGHTS OF KILLING YOURSELF IN THE PAST MONTH?: NO
6. HAVE YOU EVER DONE ANYTHING, STARTED TO DO ANYTHING, OR PREPARED TO DO ANYTHING TO END YOUR LIFE?: NO
1. IN THE PAST MONTH, HAVE YOU WISHED YOU WERE DEAD OR WISHED YOU COULD GO TO SLEEP AND NOT WAKE UP?: NO

## 2024-12-07 NOTE — DISCHARGE INSTRUCTIONS
Return to the ER if you develop severe eye pain, swelling, drainage, vision changes, or if other concerning symptoms develop.  
normal

## 2024-12-07 NOTE — ED PROVIDER NOTES
History     Chief Complaint   Patient presents with    Eye Problem     Redness LT started today       Mary Ramirez is a 44 year old female with significant pmhx of ADHD, anxiety who presents for evaluation of eye redness.  Patient states she was at the store with her mother this morning, when her mother noticed a red spot on her left eye.  Patient states prior to her mother pointed this out, she had not felt any symptoms in her left eye.  She looked in the mirror and noticed a bright red spot on her eye.  She denies any trauma to the eye or pain in the eye.  Since noticing the red spot she has developed some mild irritation, but denies drainage, eye swelling, vision changes, headache, fever.  She was concerned because her mother dealt with a serious eye infection a couple weeks ago where her entire eye swelled up.    Allergies:  Allergies   Allergen Reactions    Morphine And Codeine Nausea       Problem List:    Patient Active Problem List    Diagnosis Date Noted    Attention deficit hyperactivity disorder (ADHD), combined type 08/12/2024     Priority: Medium     Seeing psychiatry for treatment.      Heartburn 02/21/2024     Priority: Medium    Anxiety state 05/05/2014     Priority: Medium    CARDIOVASCULAR SCREENING; LDL GOAL LESS THAN 160 10/31/2010     Priority: Medium    Nevus, non-neoplastic 07/02/2008     Priority: Medium        Past Medical History:    Past Medical History:   Diagnosis Date    Depressive disorder, not elsewhere classified     HAL (generalized anxiety disorder)     Irritable bowel syndrome     Pyelonephritis, unspecified age 14       Past Surgical History:    Past Surgical History:   Procedure Laterality Date    APPENDECTOMY      CHOLECYSTECTOMY, LAPOROSCOPIC      Cholecystectomy, Laparoscopic    FRACTURE TX, WRIST RT/LT  11/2003    Left wrist, closed reduction       Family History:    Family History   Problem Relation Age of Onset    Thyroid Disease Mother         hypo    Hypertension  Father     Diabetes Brother     Breast Cancer Maternal Grandmother     C.A.D. Maternal Grandfather         CABG-in his late 50's    Diabetes Maternal Grandfather     Family History Negative Paternal Grandfather         was killed in MVA    C.A.D. Maternal Uncle         MI at age 43    Diabetes Paternal Aunt     Cerebrovascular Disease No family hx of     Cancer - colorectal No family hx of     Alcohol/Drug No family hx of        Social History:  Marital Status:  Single [1]  Social History     Tobacco Use    Smoking status: Some Days     Current packs/day: 0.00     Average packs/day: 0.1 packs/day for 10.0 years (1.0 ttl pk-yrs)     Types: Cigarettes     Start date: 1993     Last attempt to quit: 2003     Years since quittin.4    Smokeless tobacco: Never   Vaping Use    Vaping status: Never Used   Substance Use Topics    Alcohol use: Yes     Comment: rare    Drug use: No        Medications:    escitalopram (LEXAPRO) 10 MG tablet  hydroCHLOROthiazide 12.5 MG tablet  hydrOXYzine HCl (ATARAX) 25 MG tablet  LORazepam (ATIVAN) 0.5 MG tablet          Physical Exam   BP: (!) 160/101  Pulse: 88  Temp: 98.6  F (37  C)  Resp: 18  SpO2: 100 %      Physical Exam  Vitals and nursing note reviewed.   Constitutional:       Appearance: She is not ill-appearing, toxic-appearing or diaphoretic.   HENT:      Head: Normocephalic and atraumatic.      Nose: Nose normal.   Eyes:      General: Lids are normal. Gaze aligned appropriately.      Extraocular Movements: Extraocular movements intact.      Conjunctiva/sclera:      Right eye: Right conjunctiva is not injected. No chemosis, exudate or hemorrhage.     Left eye: Left conjunctiva is not injected. Hemorrhage present. No chemosis or exudate.     Pupils: Pupils are equal, round, and reactive to light.     Pulmonary:      Effort: Pulmonary effort is normal.   Musculoskeletal:      Cervical back: Normal range of motion.   Neurological:      General: No focal deficit present.       Mental Status: She is alert and oriented to person, place, and time.   Psychiatric:         Mood and Affect: Mood normal.         Behavior: Behavior normal.         ED Course        Procedures                    No results found for this or any previous visit (from the past 24 hours).    Medications - No data to display    Assessments & Plan (with Medical Decision Making)     I have reviewed the nursing notes.    I have reviewed the findings, diagnosis, plan and need for follow up with the patient.    Medical Decision Making  Mary Ramirez is a 44 year old female with significant pmhx of ADHD, anxiety who presents for evaluation of eye redness.  Differential diagnoses include conjunctivitis, periorbital cellulitis, ulceration, foreign body, subconjunctival hemorrhage.  Vital signs with hypertension at 160/101.  Patient is febrile, not tachycardic, and satting 100% on room air with a regular respiratory rate and effort.    On examination patient is well-appearing, alert, nontoxic.  Her left eye has evidence of subconjunctival hemorrhage on the lateral aspect, does not involve greater than 50% of the conjunctiva.  No evidence of hyphema.  Patient denies vision loss, trauma to the eye, drainage from the eye.  No drainage evident on exam.  No swelling of the lids, no chemosis.    Suspect patient has a nontraumatic subconjunctival hemorrhage.  We discussed that these can occur with broken blood vessels from sneezing or increase in pressure.  Discussed that these typically resolve on their own within 2-3 weeks.  She was instructed return to the ER if she develops significant swelling, drainage, pain of the eye, vision changes, headache, fever, or if other concerning symptoms develop.  Patient voiced understanding of the plan and had no further questions.      Discharge Medication List as of 12/7/2024 11:33 AM          Final diagnoses:   Subconjunctival hemorrhage of left eye       Audrey Valladares PA-C  December 7,  2024  [facility] EMERGENCY DEPT     Blaine, FELY Ventura  12/07/24 1133

## 2024-12-07 NOTE — ED TRIAGE NOTES
LT eye redness that started this morning, mother had eye infection two weeks ago.  Kamilah Elizondo MA

## 2024-12-17 ENCOUNTER — PATIENT OUTREACH (OUTPATIENT)
Dept: CARE COORDINATION | Facility: CLINIC | Age: 44
End: 2024-12-17
Payer: COMMERCIAL

## 2025-03-17 ENCOUNTER — MYC REFILL (OUTPATIENT)
Dept: FAMILY MEDICINE | Facility: CLINIC | Age: 45
End: 2025-03-17
Payer: COMMERCIAL

## 2025-03-17 DIAGNOSIS — F41.9 ANXIETY: ICD-10-CM

## 2025-03-17 RX ORDER — LORAZEPAM 0.5 MG/1
0.5 TABLET ORAL EVERY 6 HOURS PRN
Qty: 15 TABLET | Refills: 0 | Status: SHIPPED | OUTPATIENT
Start: 2025-03-17

## 2025-03-18 ENCOUNTER — MYC MEDICAL ADVICE (OUTPATIENT)
Dept: FAMILY MEDICINE | Facility: CLINIC | Age: 45
End: 2025-03-18
Payer: COMMERCIAL

## 2025-03-18 DIAGNOSIS — I10 BENIGN ESSENTIAL HYPERTENSION: Primary | ICD-10-CM

## 2025-03-18 RX ORDER — HYDROCHLOROTHIAZIDE 25 MG/1
25 TABLET ORAL DAILY
Qty: 90 TABLET | Refills: 0 | Status: SHIPPED | OUTPATIENT
Start: 2025-03-18

## 2025-03-26 ENCOUNTER — VIRTUAL VISIT (OUTPATIENT)
Dept: FAMILY MEDICINE | Facility: CLINIC | Age: 45
End: 2025-03-26
Payer: COMMERCIAL

## 2025-03-26 DIAGNOSIS — Z59.86 FINANCIAL INSECURITY: ICD-10-CM

## 2025-03-26 DIAGNOSIS — F41.9 ANXIETY: Primary | ICD-10-CM

## 2025-03-26 PROCEDURE — 98006 SYNCH AUDIO-VIDEO EST MOD 30: CPT | Performed by: FAMILY MEDICINE

## 2025-03-26 RX ORDER — CLONAZEPAM 0.5 MG/1
.25-.5 TABLET ORAL DAILY PRN
Qty: 30 TABLET | Refills: 0 | Status: SHIPPED | OUTPATIENT
Start: 2025-03-26

## 2025-03-26 SDOH — ECONOMIC STABILITY - INCOME SECURITY: FINANCIAL INSECURITY: Z59.86

## 2025-03-26 ASSESSMENT — ANXIETY QUESTIONNAIRES
4. TROUBLE RELAXING: NOT AT ALL
GAD7 TOTAL SCORE: 5
2. NOT BEING ABLE TO STOP OR CONTROL WORRYING: SEVERAL DAYS
GAD7 TOTAL SCORE: 5
7. FEELING AFRAID AS IF SOMETHING AWFUL MIGHT HAPPEN: SEVERAL DAYS
7. FEELING AFRAID AS IF SOMETHING AWFUL MIGHT HAPPEN: SEVERAL DAYS
5. BEING SO RESTLESS THAT IT IS HARD TO SIT STILL: NOT AT ALL
3. WORRYING TOO MUCH ABOUT DIFFERENT THINGS: SEVERAL DAYS
1. FEELING NERVOUS, ANXIOUS, OR ON EDGE: SEVERAL DAYS
GAD7 TOTAL SCORE: 5
8. IF YOU CHECKED OFF ANY PROBLEMS, HOW DIFFICULT HAVE THESE MADE IT FOR YOU TO DO YOUR WORK, TAKE CARE OF THINGS AT HOME, OR GET ALONG WITH OTHER PEOPLE?: VERY DIFFICULT
6. BECOMING EASILY ANNOYED OR IRRITABLE: SEVERAL DAYS
IF YOU CHECKED OFF ANY PROBLEMS ON THIS QUESTIONNAIRE, HOW DIFFICULT HAVE THESE PROBLEMS MADE IT FOR YOU TO DO YOUR WORK, TAKE CARE OF THINGS AT HOME, OR GET ALONG WITH OTHER PEOPLE: VERY DIFFICULT

## 2025-03-26 ASSESSMENT — PATIENT HEALTH QUESTIONNAIRE - PHQ9
SUM OF ALL RESPONSES TO PHQ QUESTIONS 1-9: 8
SUM OF ALL RESPONSES TO PHQ QUESTIONS 1-9: 8
10. IF YOU CHECKED OFF ANY PROBLEMS, HOW DIFFICULT HAVE THESE PROBLEMS MADE IT FOR YOU TO DO YOUR WORK, TAKE CARE OF THINGS AT HOME, OR GET ALONG WITH OTHER PEOPLE: VERY DIFFICULT

## 2025-03-26 ASSESSMENT — ENCOUNTER SYMPTOMS: NERVOUS/ANXIOUS: 1

## 2025-03-26 NOTE — PROGRESS NOTES
"Mary is a 44 year old who is being evaluated via a billable video visit.    How would you like to obtain your AVS? MyChart  If the video visit is dropped, the invitation should be resent by: Text to cell phone: [phone]  Will anyone else be joining your video visit? No      Assessment & Plan     Anxiety  Hopefully the increase in Lexapro which she started last week will become effective in the next few weeks.  Will trial clonazepam instead of Ativan in hopes that this will help her for a longer time throughout the day.  Discussed that this is not a long-term solution but can be helpful while we are waiting for the Lexapro to become effective.    She will reach out to me in 3 to 4 weeks to let me know how things are going.  - clonazePAM (KLONOPIN) 0.5 MG tablet; Take 0.5-1 tablets (0.25-0.5 mg) by mouth daily as needed for anxiety.    Financial insecurity  Referral for the care coordinator (although potentially a financial resources referral would be more appropriate?)  - Primary Care - Care Coordination Referral; Future      The longitudinal plan of care for the diagnosis(es)/condition(s) as documented were addressed during this visit. Due to the added complexity in care, I will continue to support this patient in the subsequent management and with ongoing continuity of care.      BMI  Estimated body mass index is 30.57 kg/m  as calculated from the following:    Height as of 8/12/24: 1.74 m (5' 8.5\").    Weight as of 8/12/24: 92.5 kg (204 lb).   Weight management plan: Discussed healthy diet and exercise guidelines          Subjective   Mary is a 44 year old, presenting for the following health issues:  Anxiety    History of Present Illness       Mental Health Follow-up:  Patient presents to follow-up on Depression & Anxiety.Patient's depression since last visit has been:  Worse  The patient is not having other symptoms associated with depression.  Patient's anxiety since last visit has been:  Worse  The " "patient is not having other symptoms associated with anxiety.  Any significant life events: other  Patient is feeling anxious or having panic attacks.  Patient has no concerns about alcohol or drug use.    She eats 2-3 servings of fruits and vegetables daily.She consumes 1 sweetened beverage(s) daily.She exercises with enough effort to increase her heart rate 9 or less minutes per day.  She exercises with enough effort to increase her heart rate 3 or less days per week.   She is taking medications regularly.      Patient is currently taking Lexapro 10 mg daily for her anxiety.  She actually has been doing very well with the medication until a few months ago.  She thinks that this might be some suicidal pressures as well as from her work.  She is living \"paycheck to paycheck\" which she finds to be very stressful.  She cannot afford her therapist anymore unfortunately.  She did reach out to me over an e-visit last week and we increased her Lexapro to 20 mg daily.    She has been using her Ativan 1-2 times daily (whereas she was only using it 1 or 2 times per month previously) because of this I reached out to her to schedule today's visit.  She states that the Ativan helps with her anxiety and she feels more motivated and can get out of bed and be productive for 4 to 5 hours until it wears off.    She has a good friend whom she can talk to.  Her daughter is 18 and she feels as though she can talk with her as well.  She does miss having a therapist.              Review of Systems  Constitutional, HEENT, cardiovascular, pulmonary, GI, , musculoskeletal, neuro, skin, endocrine and psych systems are negative, except as otherwise noted.      Objective           Vitals:  No vitals were obtained today due to virtual visit.    Physical Exam   GENERAL: alert and no distress  EYES: Eyes grossly normal to inspection.  No discharge or erythema, or obvious scleral/conjunctival abnormalities.  RESP: No audible wheeze, cough, or " visible cyanosis.    SKIN: Visible skin clear. No significant rash, abnormal pigmentation or lesions.  NEURO: Cranial nerves grossly intact.  Mentation and speech appropriate for age.  PSYCH: Appropriate affect, tone, and pace of words          Video-Visit Details    Type of service:  Video Visit   Originating Location (pt. Location): Home    Distant Location (provider location):  On-site  Platform used for Video Visit: Augustine  Signed Electronically by: Ekta Coello MD

## 2025-03-27 ENCOUNTER — PATIENT OUTREACH (OUTPATIENT)
Dept: CARE COORDINATION | Facility: CLINIC | Age: 45
End: 2025-03-27
Payer: COMMERCIAL

## 2025-03-27 DIAGNOSIS — Z71.89 OTHER SPECIFIED COUNSELING: Primary | Chronic | ICD-10-CM

## 2025-03-27 NOTE — PROGRESS NOTES
Clinic Care Coordination Contact  Community Health Worker Initial Outreach    CHW Initial Information Gathering:  Referral Source: PCP  Preferred Hospital: [place], [place]  [phone]  Preferred Urgent Care: [facility] [place], [phone]  Current living arrangement:: I live in a private home with family  Type of residence:: [facility]  Community Resources: None  Supplies Currently Used at Home: None  Equipment Currently Used at Home: none  Informal Support system:: Family, Parent  No PCP office visit in Past Year: No  Transportation means:: Regular car       Patient accepts CC: Yes. Patient scheduled for assessment with the SW on 3/28/25 at 10:00 am. Patient noted desire to discuss supports for therapy options and financial supports.     KORIN Lucio  [phone]  [facility] Resource [facility]

## 2025-03-30 ENCOUNTER — APPOINTMENT (OUTPATIENT)
Dept: GENERAL RADIOLOGY | Facility: CLINIC | Age: 45
End: 2025-03-30
Attending: EMERGENCY MEDICINE
Payer: COMMERCIAL

## 2025-03-30 ENCOUNTER — HOSPITAL ENCOUNTER (EMERGENCY)
Facility: CLINIC | Age: 45
Discharge: HOME | End: 2025-03-30
Attending: EMERGENCY MEDICINE | Admitting: EMERGENCY MEDICINE
Payer: COMMERCIAL

## 2025-03-30 VITALS
RESPIRATION RATE: 20 BRPM | HEART RATE: 106 BPM | OXYGEN SATURATION: 94 % | DIASTOLIC BLOOD PRESSURE: 102 MMHG | TEMPERATURE: 98.2 F | WEIGHT: 200 LBS | SYSTOLIC BLOOD PRESSURE: 159 MMHG | BODY MASS INDEX: 29.97 KG/M2

## 2025-03-30 DIAGNOSIS — S61.451A CAT BITE OF RIGHT HAND, INITIAL ENCOUNTER: ICD-10-CM
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
## 2025-03-30 PROCEDURE — 250N000013 HC RX MED GY IP 250 OP 250 PS 637: Performed by: EMERGENCY MEDICINE

## 2025-03-30 PROCEDURE — 99283 EMERGENCY DEPT VISIT LOW MDM: CPT | Performed by: EMERGENCY MEDICINE

## 2025-03-30 PROCEDURE — 73130 X-RAY EXAM OF HAND: CPT | Mod: RT

## 2025-03-30 RX ORDER — IBUPROFEN 600 MG/1
600 TABLET, FILM COATED ORAL ONCE
Status: COMPLETED | OUTPATIENT
Start: 2025-03-30 | End: 2025-03-30

## 2025-03-30 RX ADMIN — AMOXICILLIN AND CLAVULANATE POTASSIUM 1 TABLET: 875; 125 TABLET, COATED ORAL at 23:40

## 2025-03-30 RX ADMIN — IBUPROFEN 600 MG: 600 TABLET, FILM COATED ORAL at 23:30

## 2025-03-30 ASSESSMENT — ACTIVITIES OF DAILY LIVING (ADL): ADLS_ACUITY_SCORE: 41

## 2025-03-31 NOTE — DISCHARGE INSTRUCTIONS
Take the antibiotics as prescribed.  Keep the wounds clean with gentle soap and water.  Return if you have any worsening symptoms or other concerns.  Otherwise follow-up in clinic.

## 2025-03-31 NOTE — ED TRIAGE NOTES
"Pt got bit by her cat around 1800.  Pt hand has since increased in swelling and rating pain 10/10.  Pt is able to move all her fingers. Pt stated she is scared and since she was scared she had \"Some drinks tonight\".       Triage Assessment (Adult)       Row Name 03/30/25 7572          Triage Assessment    Airway WDL WDL        Respiratory WDL    Respiratory WDL WDL        Skin Circulation/Temperature WDL    Skin Circulation/Temperature WDL WDL        Cardiac WDL    Cardiac WDL WDL        Peripheral/Neurovascular WDL    Peripheral Neurovascular WDL WDL        Cognitive/Neuro/Behavioral WDL    Cognitive/Neuro/Behavioral WDL WDL                     "

## 2025-03-31 NOTE — ED PROVIDER NOTES
History     Chief Complaint   Patient presents with    Cat Bite     HPI  Mary Ramirez is a 44 year old female who presents for evaluation after a cat bite to her right hand.  This happened earlier today, she was trying to keep her cat away from her grandson when it bit her hand several times.  She has had increasing pain and some swelling around the bite area since this happened over several hours.  No fevers.  She has not take anything for the pain but she did drink several drinks of alcohol because of her anxiety over the cat bite.  Her tetanus is up-to-date after I reviewed her data on [facility].     Allergies:  Allergies   Allergen Reactions    Morphine And Codeine Nausea       Problem List:    Patient Active Problem List    Diagnosis Date Noted    Attention deficit hyperactivity disorder (ADHD), combined type 08/12/2024     Priority: Medium     Seeing psychiatry for treatment.      Heartburn 02/21/2024     Priority: Medium    Anxiety state 05/05/2014     Priority: Medium    CARDIOVASCULAR SCREENING; LDL GOAL LESS THAN 160 10/31/2010     Priority: Medium    Nevus, non-neoplastic 07/02/2008     Priority: Medium        Past Medical History:    Past Medical History:   Diagnosis Date    Depressive disorder, not elsewhere classified     HAL (generalized anxiety disorder)     Irritable bowel syndrome     Pyelonephritis, unspecified age 14       Past Surgical History:    Past Surgical History:   Procedure Laterality Date    APPENDECTOMY      CHOLECYSTECTOMY, LAPOROSCOPIC      Cholecystectomy, Laparoscopic    FRACTURE TX, WRIST RT/LT  11/2003    Left wrist, closed reduction       Family History:    Family History   Problem Relation Age of Onset    Thyroid Disease Mother         hypo    Hypertension Father     Diabetes Brother     Breast Cancer Maternal Grandmother     JOEY Maternal Grandfather         CABG-in his late 50's    Diabetes Maternal Grandfather     Family History Negative Paternal Grandfather         was  killed in MVA    C.A.D. Maternal Uncle         MI at age 43    Diabetes Paternal Aunt     Cerebrovascular Disease No family hx of     Cancer - colorectal No family hx of     Alcohol/Drug No family hx of        Social History:  Marital Status:  Single [1]  Social History     Tobacco Use    Smoking status: Some Days     Current packs/day: 0.00     Average packs/day: 0.1 packs/day for 10.0 years (1.0 ttl pk-yrs)     Types: Cigarettes     Start date: 1993     Last attempt to quit: 2003     Years since quittin.7    Smokeless tobacco: Never   Vaping Use    Vaping status: Never Used   Substance Use Topics    Alcohol use: Yes     Comment: rare    Drug use: No        Medications:    amoxicillin-clavulanate (AUGMENTIN) 875-125 MG tablet  clonazePAM (KLONOPIN) 0.5 MG tablet  escitalopram (LEXAPRO) 10 MG tablet  escitalopram (LEXAPRO) 20 MG tablet  hydrochlorothiazide (HYDRODIURIL) 25 MG tablet  hydrOXYzine HCl (ATARAX) 25 MG tablet  LORazepam (ATIVAN) 0.5 MG tablet          Review of Systems    Physical Exam   BP: (!) 159/102  Pulse: 106  Temp: 98.2  F (36.8  C)  Resp: 20  Weight: 90.7 kg (200 lb)  SpO2: 94 %      Physical Exam  Constitutional:       General: She is not in acute distress.     Appearance: She is well-developed.   HENT:      Head: Normocephalic and atraumatic.   Cardiovascular:      Rate and Rhythm: Normal rate.   Pulmonary:      Effort: No respiratory distress.      Breath sounds: No stridor.   Skin:     General: Skin is warm and dry.      Comments: Cat bite to the back of the right hand with diffuse tenderness and some mild erythema and swelling over the MCP joint of the third digit.   Neurological:      Mental Status: She is alert.               ED Course        Procedures              Critical Care time:  none     None         Results for orders placed or performed during the hospital encounter of 25 (from the past 24 hours)   XR Hand Right G/E 3 Views    Narrative    EXAM: XR HAND RIGHT  G/E 3 VIEWS  LOCATION: [facility]  DATE: 3/30/2025    INDICATION: cat bite to back of hand  COMPARISON: None.      Impression    IMPRESSION: There is mild soft tissue swelling with some mild associated gas seen involving the dorsal aspect of the right hand as best seen on the lateral projection. No fracture, dislocation, obvious osteomyelitis, or findings of a radiodense foreign   body identified.       Medications   ibuprofen (ADVIL/MOTRIN) tablet 600 mg (600 mg Oral $Given 3/30/25 2330)   amoxicillin-clavulanate (AUGMENTIN) 875-125 MG per tablet 1 tablet (1 tablet Oral $Given 3/30/25 2340)       Assessments & Plan (with Medical Decision Making)   44-year-old female presents for evaluation after a cat bite to the right hand.  She is given ibuprofen for the pain.  X-ray of the hand obtained, images interpreted dependently as well as radiology read reviewed, no signs of retained foreign body.  The wounds were cleaned with water and soap and she is safe to discharge home with a prescription for amoxicillin-clavulanate and instructions to return if worse, otherwise follow-up in clinic.  The patient is in agreement with this plan.    I have reviewed the nursing notes.    I have reviewed the findings, diagnosis, plan and need for follow up with the patient.           Medical Decision Making  The patient's presentation was of low complexity (an acute and uncomplicated illness or injury).    The patient's evaluation involved:  review of external note(s) from 1 sources (see separate area of note for details)  ordering and/or review of 1 test(s) in this encounter (see separate area of note for details)  independent interpretation of testing performed by another health professional (see separate area of note for details)    The patient's management necessitated moderate risk (prescription drug management including medications given in the ED).        Discharge Medication List as of 3/30/2025 11:36  PM        START taking these medications    Details   amoxicillin-clavulanate (AUGMENTIN) 875-125 MG tablet Take 1 tablet by mouth 2 times daily for 5 days., Disp-10 tablet, R-0, E-Prescribe             Final diagnoses:   Cat bite of right hand, initial encounter       3/30/2025   [facility] EMERGENCY DEPT       Imtiaz Rodriguez MD  03/31/25 6634

## 2025-04-02 ENCOUNTER — TELEPHONE (OUTPATIENT)
Dept: FAMILY MEDICINE | Facility: CLINIC | Age: 45
End: 2025-04-02
Payer: COMMERCIAL

## 2025-04-03 ENCOUNTER — OFFICE VISIT (OUTPATIENT)
Dept: FAMILY MEDICINE | Facility: CLINIC | Age: 45
End: 2025-04-03
Payer: COMMERCIAL

## 2025-04-03 VITALS
WEIGHT: 205 LBS | OXYGEN SATURATION: 98 % | HEART RATE: 92 BPM | BODY MASS INDEX: 30.36 KG/M2 | DIASTOLIC BLOOD PRESSURE: 92 MMHG | TEMPERATURE: 98 F | RESPIRATION RATE: 16 BRPM | SYSTOLIC BLOOD PRESSURE: 150 MMHG | HEIGHT: 69 IN

## 2025-04-03 DIAGNOSIS — L03.113 CELLULITIS OF RIGHT UPPER EXTREMITY: ICD-10-CM
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
## 2025-04-03 RX ORDER — METRONIDAZOLE 500 MG/1
500 TABLET ORAL 2 TIMES DAILY
COMMUNITY
Start: 2025-04-01 | End: 2025-04-11

## 2025-04-03 RX ORDER — CEFUROXIME AXETIL 500 MG/1
500 TABLET ORAL 2 TIMES DAILY
COMMUNITY
Start: 2025-04-01 | End: 2025-04-11

## 2025-04-15 ENCOUNTER — MYC MEDICAL ADVICE (OUTPATIENT)
Dept: FAMILY MEDICINE | Facility: CLINIC | Age: 45
End: 2025-04-15
Payer: COMMERCIAL

## 2025-04-16 NOTE — TELEPHONE ENCOUNTER
Saw pt in clinic with her daughter.  Has some persistent swelling overlying the MCP joint of the 3rd digit.  Mildly erythematous, no warmth.  Pt completed antibiotic last Thursday.  Has not had any progression of redness, warmth or swelling, in fact feels the swelling continues to decrease over time.  Full pain free ROM of hand and fingers.    Reassurance given as things continue to improve.  Reviewed signs an symptoms that should prompt her to seek additional care.    Dr. Dianne Caldwell, DO

## 2025-06-02 ENCOUNTER — MYC REFILL (OUTPATIENT)
Dept: FAMILY MEDICINE | Facility: CLINIC | Age: 45
End: 2025-06-02
Payer: COMMERCIAL

## 2025-06-02 DIAGNOSIS — F41.9 ANXIETY: ICD-10-CM

## 2025-06-02 RX ORDER — CLONAZEPAM 0.5 MG/1
.25-.5 TABLET ORAL DAILY PRN
Qty: 30 TABLET | Refills: 0 | Status: SHIPPED | OUTPATIENT
Start: 2025-06-02

## 2025-06-02 NOTE — LETTER
An 3, 2025      Mary Ramirez  [address] [place] [id number]        To Whom It May Concern,     Mary Ramirez is my patient at the [facility].  Her clonazepam which was filled on 3/26/2025 was unintentionally destroyed.  It is my medical opinion that she should be approved for a one-time early refill of her clonazepam.    Pending Prescriptions:                       Disp   Refills    clonazePAM (KLONOPIN) 0.5 MG tablet        30 tab*0        Sig: Take 0.5-1 tablets (0.25-0.5 mg) by mouth daily as           needed for anxiety.         Sincerely,        Ekta Coello MD    Electronically signed

## 2025-06-02 NOTE — TELEPHONE ENCOUNTER
Pending Prescriptions:                       Disp   Refills    clonazePAM (KLONOPIN) 0.5 MG tablet        30 tab*0        Sig: Take 0.5-1 tablets (0.25-0.5 mg) by mouth daily as           needed for anxiety.    Routing refill request to provider for review/approval because:  Drug not on the [facility] refill protocol     Patient comment: Antoni Coello, this is embarrassing, I knocked them into the toilet yesterday when  cleaning.

## 2025-06-16 DIAGNOSIS — I10 BENIGN ESSENTIAL HYPERTENSION: ICD-10-CM

## 2025-06-16 RX ORDER — HYDROCHLOROTHIAZIDE 25 MG/1
25 TABLET ORAL DAILY
Qty: 90 TABLET | Refills: 0 | Status: SHIPPED | OUTPATIENT
Start: 2025-06-16

## 2025-06-23 ENCOUNTER — E-VISIT (OUTPATIENT)
Dept: FAMILY MEDICINE | Facility: CLINIC | Age: 45
End: 2025-06-23
Payer: COMMERCIAL

## 2025-06-23 DIAGNOSIS — F41.9 ANXIETY: Primary | ICD-10-CM

## 2025-06-23 ASSESSMENT — PATIENT HEALTH QUESTIONNAIRE - PHQ9
SUM OF ALL RESPONSES TO PHQ QUESTIONS 1-9: 9
10. IF YOU CHECKED OFF ANY PROBLEMS, HOW DIFFICULT HAVE THESE PROBLEMS MADE IT FOR YOU TO DO YOUR WORK, TAKE CARE OF THINGS AT HOME, OR GET ALONG WITH OTHER PEOPLE: VERY DIFFICULT
SUM OF ALL RESPONSES TO PHQ QUESTIONS 1-9: 9

## 2025-06-25 RX ORDER — ESCITALOPRAM OXALATE 10 MG/1
10 TABLET ORAL DAILY
Qty: 90 TABLET | Refills: 1 | Status: SHIPPED | OUTPATIENT
Start: 2025-06-25

## 2025-06-25 RX ORDER — BUPROPION HYDROCHLORIDE 150 MG/1
150 TABLET ORAL EVERY MORNING
Qty: 90 TABLET | Refills: 1 | Status: SHIPPED | OUTPATIENT
Start: 2025-06-25

## 2025-08-11 ENCOUNTER — PATIENT OUTREACH (OUTPATIENT)
Dept: CARE COORDINATION | Facility: CLINIC | Age: 45
End: 2025-08-11
Payer: COMMERCIAL